# Patient Record
Sex: MALE | Race: WHITE
[De-identification: names, ages, dates, MRNs, and addresses within clinical notes are randomized per-mention and may not be internally consistent; named-entity substitution may affect disease eponyms.]

---

## 2019-03-05 ENCOUNTER — HOSPITAL ENCOUNTER (EMERGENCY)
Dept: HOSPITAL 97 - ER | Age: 69
LOS: 1 days | Discharge: ANOTHER HOSPITAL | End: 2019-03-06
Payer: COMMERCIAL

## 2019-03-05 DIAGNOSIS — Z88.5: ICD-10-CM

## 2019-03-05 DIAGNOSIS — Z79.82: ICD-10-CM

## 2019-03-05 DIAGNOSIS — Z95.810: ICD-10-CM

## 2019-03-05 DIAGNOSIS — I50.40: ICD-10-CM

## 2019-03-05 DIAGNOSIS — D63.1: ICD-10-CM

## 2019-03-05 DIAGNOSIS — I12.9: Primary | ICD-10-CM

## 2019-03-05 DIAGNOSIS — E87.6: ICD-10-CM

## 2019-03-05 DIAGNOSIS — N17.9: ICD-10-CM

## 2019-03-05 DIAGNOSIS — Z95.1: ICD-10-CM

## 2019-03-05 DIAGNOSIS — Z79.01: ICD-10-CM

## 2019-03-05 DIAGNOSIS — Z88.8: ICD-10-CM

## 2019-03-05 DIAGNOSIS — E11.22: ICD-10-CM

## 2019-03-05 DIAGNOSIS — Z79.4: ICD-10-CM

## 2019-03-05 DIAGNOSIS — N18.9: ICD-10-CM

## 2019-03-05 LAB
ALBUMIN SERPL BCP-MCNC: 3.6 G/DL (ref 3.4–5)
ALBUMIN SERPL BCP-MCNC: 3.7 G/DL (ref 3.4–5)
ALP SERPL-CCNC: 50 U/L (ref 45–117)
ALP SERPL-CCNC: 54 U/L (ref 45–117)
ALT SERPL W P-5'-P-CCNC: 14 U/L (ref 12–78)
ALT SERPL W P-5'-P-CCNC: 16 U/L (ref 12–78)
AST SERPL W P-5'-P-CCNC: 14 U/L (ref 15–37)
AST SERPL W P-5'-P-CCNC: 15 U/L (ref 15–37)
BUN BLD-MCNC: 25 MG/DL (ref 7–18)
BUN BLD-MCNC: 25 MG/DL (ref 7–18)
GLUCOSE SERPLBLD-MCNC: 260 MG/DL (ref 74–106)
GLUCOSE SERPLBLD-MCNC: 269 MG/DL (ref 74–106)
HCT VFR BLD CALC: 23.6 % (ref 39.6–49)
INR BLD: 1.69
LYMPHOCYTES # SPEC AUTO: 1.1 K/UL (ref 0.7–4.9)
MAGNESIUM SERPL-MCNC: 2.3 MG/DL (ref 1.8–2.4)
NT-PROBNP SERPL-MCNC: 2086 PG/ML (ref ?–125)
PMV BLD: 7.3 FL (ref 7.6–11.3)
POTASSIUM SERPL-SCNC: 3.4 MMOL/L (ref 3.5–5.1)
POTASSIUM SERPL-SCNC: 3.5 MMOL/L (ref 3.5–5.1)
RBC # BLD: 2.83 M/UL (ref 4.33–5.43)
TROPONIN (EMERG DEPT USE ONLY): < 0.02 NG/ML (ref 0–0.04)

## 2019-03-05 PROCEDURE — 83735 ASSAY OF MAGNESIUM: CPT

## 2019-03-05 PROCEDURE — 99284 EMERGENCY DEPT VISIT MOD MDM: CPT

## 2019-03-05 PROCEDURE — 80076 HEPATIC FUNCTION PANEL: CPT

## 2019-03-05 PROCEDURE — 83880 ASSAY OF NATRIURETIC PEPTIDE: CPT

## 2019-03-05 PROCEDURE — 80053 COMPREHEN METABOLIC PANEL: CPT

## 2019-03-05 PROCEDURE — 30901 CONTROL OF NOSEBLEED: CPT

## 2019-03-05 PROCEDURE — 36430 TRANSFUSION BLD/BLD COMPNT: CPT

## 2019-03-05 PROCEDURE — 96374 THER/PROPH/DIAG INJ IV PUSH: CPT

## 2019-03-05 PROCEDURE — 84484 ASSAY OF TROPONIN QUANT: CPT

## 2019-03-05 PROCEDURE — 71045 X-RAY EXAM CHEST 1 VIEW: CPT

## 2019-03-05 PROCEDURE — 86850 RBC ANTIBODY SCREEN: CPT

## 2019-03-05 PROCEDURE — 86901 BLOOD TYPING SEROLOGIC RH(D): CPT

## 2019-03-05 PROCEDURE — 85610 PROTHROMBIN TIME: CPT

## 2019-03-05 PROCEDURE — 80048 BASIC METABOLIC PNL TOTAL CA: CPT

## 2019-03-05 PROCEDURE — 36415 COLL VENOUS BLD VENIPUNCTURE: CPT

## 2019-03-05 PROCEDURE — 86900 BLOOD TYPING SEROLOGIC ABO: CPT

## 2019-03-05 PROCEDURE — 85025 COMPLETE CBC W/AUTO DIFF WBC: CPT

## 2019-03-05 PROCEDURE — 93005 ELECTROCARDIOGRAM TRACING: CPT

## 2019-03-05 NOTE — EDPHYS
Physician Documentation                                                                           

 [facility]                                                                

Name: Tu Marie                                                                                 

Age: 68 yrs                                                                                       

Sex: Male                                                                                         

: 1950                                                                                   

MRN: [id number]                                                                                   

Arrival Date: 2019                                                                          

Time: 20:41                                                                                       

Account#: [id number]                                                                            

Bed 2                                                                                             

Private MD:                                                                                       

ED Yaniv Ontiveros                                                                      

HPI:                                                                                              

                                                                                             

21:16 This 68 yrs old  Male presents to ER via Ambulatory with complaints of Nose    stefanie 

      Bleed.                                                                                      

21:16 The patient presents with a nose bleed. Onset: The symptoms/episode began/occurred just stefanie 

      prior to arrival. Modifying factors: The symptoms are alleviated by nothing. the            

      symptoms are aggravated by nothing. Associated signs and symptoms: The patient has no       

      apparent associated signs or symptoms. Severity of symptoms: At their worst the             

      symptoms were. The patient has not experienced similar symptoms in the past.                

                                                                                                  

Historical:                                                                                       

- Allergies:                                                                                      

20:59 Codeine;                                                                                tl2 

20:59 Fentanyl;                                                                               tl2 

- Home Meds:                                                                                      

20:59 aspirin 81 mg Oral chew 1 tab once daily [Active]; carvedilol 25 mg Oral tab 1 tab      tl2 

      daily [Active]; Crestor 5 mg Oral tab 1 tab once daily [Active]; fenofibrate 150 mg         

      Oral cap 1 cap once daily [Active]; ferrous sulfate 325 mg (65 mg iron) Oral tab daily      

      [Active]; furosemide 40 mg Oral tab 1 tab once daily [Active]; Lantus 100 unit/mL Sub-Q     

      soln 26 unit daily [Active]; liraglutide subcutaneous subcutaneous 0.3 mL once daily        

      [Active]; lisinopril 5 mg Oral tab 1 tab twice a day [Active]; Multiple Vitamins Oral       

      tab daily [Active]; potassium chloride 20 mEq Oral TbTQ 1 tab once daily [Active];          

      rosuvastatin 5 mg Oral tab 1 tab once daily [Active]; tamsulosin 0.4 mg Oral cp24 1 cap     

      once daily [Active]; Warfarin 1.5 mg Oral once daily [Active]; victoza 1.8 mg daily         

      [Active];                                                                                   

- PMHx:                                                                                           

20:59 CAD; CHF; Diabetes - IDDM; Hyperlipidemia; Hypertension;                                tl2 

- PSHx:                                                                                           

20:59 LVAD; CABG; pacemaker;                                                                  tl2 

                                                                                                  

- Immunization history:: Adult Immunizations up to date.                                          

- Social history:: Smoking status: Patient/guardian denies using tobacco.                         

- Ebola Screening: : No symptoms or risks identified at this time.                                

- Family history:: not pertinent.                                                                 

                                                                                                  

                                                                                                  

ROS:                                                                                              

21:16 Constitutional: Negative for fever, chills, and weight loss, Eyes: Negative for injury, stefanie 

      pain, redness, and discharge, Neck: Negative for injury, pain, and swelling,                

      Cardiovascular: Negative for chest pain, palpitations, and edema, Respiratory: Negative     

      for shortness of breath, cough, wheezing, and pleuritic chest pain, Abdomen/GI:             

      Negative for abdominal pain, nausea, vomiting, diarrhea, and constipation, Back:            

      Negative for injury and pain, : Negative for injury, bleeding, discharge, and             

      swelling, MS/Extremity: Negative for injury and deformity, Skin: Negative for injury,       

      rash, and discoloration, Neuro: Negative for headache, weakness, numbness, tingling,        

      and seizure, Psych: Negative for depression, anxiety, suicide ideation, homicidal           

      ideation, and hallucinations, Allergy/Immunology: Negative for hives, rash, and             

      allergies, Endocrine: Negative for neck swelling, polydipsia, polyuria, polyphagia, and     

      marked weight changes.                                                                      

21:16 ENT: Positive for nose bleed.                                                               

                                                                                                  

Exam:                                                                                             

21:16 Constitutional:  This is a well developed, well nourished patient who is awake, alert,  stefanie 

      and in no acute distress. Head/Face:  Normocephalic, atraumatic. Eyes:  Pupils equal        

      round and reactive to light, extra-ocular motions intact.  Lids and lashes normal.          

      Conjunctiva and sclera are non-icteric and not injected.  Cornea within normal limits.      

      Periorbital areas with no swelling, redness, or edema. Neck:  Trachea midline, no           

      thyromegaly or masses palpated, and no cervical lymphadenopathy.  Supple, full range of     

      motion without nuchal rigidity, or vertebral point tenderness.  No Meningismus.             

      Chest/axilla:  Normal chest wall appearance and motion.  Nontender with no deformity.       

      No lesions are appreciated. Cardiovascular:  Regular rate and rhythm with a normal S1       

      and S2.  No gallops, murmurs, or rubs.  Normal PMI, no JVD.  No pulse deficits.             

      Respiratory:  Lungs have equal breath sounds bilaterally, clear to auscultation and         

      percussion.  No rales, rhonchi or wheezes noted.  No increased work of breathing, no        

      retractions or nasal flaring. Abdomen/GI:  Soft, non-tender, with normal bowel sounds.      

      No distension or tympany.  No guarding or rebound.  No evidence of tenderness               

      throughout. Back:  No spinal tenderness.  No costovertebral tenderness.  Full range of      

      motion. Skin:  Warm, dry with normal turgor.  Normal color with no rashes, no lesions,      

      and no evidence of cellulitis. MS/ Extremity:  Pulses equal, no cyanosis.                   

      Neurovascular intact.  Full, normal range of motion. Neuro:  Awake and alert, GCS 15,       

      oriented to person, place, time, and situation.  Cranial nerves II-XII grossly intact.      

      Motor strength 5/5 in all extremities.  Sensory grossly intact.  Cerebellar exam            

      normal.  Normal gait. Psych:  Awake, alert, with orientation to person, place and time.     

       Behavior, mood, and affect are within normal limits.                                       

21:16 ENT: Nose: Nasal mucosa: Dried blood. edematous.                                            

                                                                                                  

Vital Signs:                                                                                      

20:50  / 86; Pulse 73; Resp 18; Temp 97.9; Pulse Ox 100% on R/A; Weight 87.54 kg;       tl2 

      Height 5 ft. 7 in. (170.18 cm); Pain 0/10;                                                  

22:24  / 96; Pulse 69; Resp 18; Pulse Ox 96% on R/A;                                    tl2 

23:26  / 101; Pulse 75; Resp 18; Pulse Ox 96% on R/A;                                   tl2 

03/06                                                                                             

00:24  / 83; Pulse 73; Resp 24; Pulse Ox 99% on R/A;                                    tl2 

01:30  / 86; Pulse 73; Resp 19; Temp 98.5; Pulse Ox 98% on R/A;                         2 

03                                                                                             

20:50 Body Mass Index 30.23 (87.54 kg, 170.18 cm)                                             tl2 

                                                                                                  

Procedures:                                                                                       

                                                                                             

21:16 Epistaxis treatment: A moderate amount of bleeding noted from left nare.                [facility] 

22:33 Epistaxis treatment: Treated using anterior packing, Vaseline gauze, Bleeding stopped.  [facility] 

                                                                                                  

MDM:                                                                                              

21:05 Patient medically screened.                                                             [facility] 

21:16 Data reviewed: vital signs, nurses notes, lab test result(s).                           [facility] 

                                                                                                  

                                                                                             

21:16 Order name: CBC with Diff                                                               [facility] 

                                                                                             

21:16 Order name: Comprehensive Metabolic Panel; Complete Time: 22:10                         [facility] 

                                                                                             

21:16 Order name: PT-INR; Complete Time: 22:36                                                [facility] 

                                                                                             

22:11 Order name: Basic Metabolic Panel                                                       [facility] 

                                                                                             

22:11 Order name: LFT's                                                                       [facility] 

                                                                                             

22:11 Order name: Magnesium                                                                   [facility] 

                                                                                             

22:11 Order name: NT PRO-BNP                                                                  [facility] 

                                                                                             

22:11 Order name: Troponin (emerg Dept Use Only)                                              [facility] 

                                                                                             

22:11 Order name: XRAY Chest (1 view)                                                         [facility] 

                                                                                             

22:11 Order name: Type And Screen                                                             [facility] 

                                                                                             

23:08 Order name: ABO/RH no charge                                                            [facility]

                                                                                             

00:47 Order name: Packed RBC Leukored AS-1                                                    [facility]

                                                                                             

21:32 Order name: Dressing - Wound; Complete Time: 22:42                                      [facility] 

                                                                                             

21:32 Order name: Gloves, Sterile; Complete Time: 21:36                                       [facility] 

                                                                                             

21:32 Order name: Setup Suture Tray; Complete Time: 21:36                                     [facility] 

                                                                                             

22:11 Order name: EKG; Complete Time: 22:13                                                   [facility] 

                                                                                             

22:11 Order name: Cardiac monitoring; Complete Time: 22:42                                    [facility] 

                                                                                             

22:11 Order name: EKG - Nurse/Tech; Complete Time: 22:41                                      [facility] 

                                                                                             

22:11 Order name: IV Saline Lock; Complete Time: 22:12                                        [facility] 

                                                                                             

22:11 Order name: Labs collected and sent; Complete Time: 22:13                               [facility] 

                                                                                             

22:11 Order name: O2 Per Protocol; Complete Time: 22:13                                       [facility] 

                                                                                             

22:11 Order name: O2 Sat Monitoring; Complete Time: 22:13                                     [facility] 

                                                                                             

22:35 Order name: Transfuse; Complete Time: 01:56                                             [facility] 

                                                                                                  

Administered Medications:                                                                         

22:42 Not Given (Physician Discretion): NS 0.9% 1000 ml IV at 75 ml/hr continuous             tl2 

23:01 Drug: ProTONIX 40 mg Route: IVP; Site: left forearm;                                    tl2 

                                                                                             

01:58 Follow up: Response: No adverse reaction                                                tl2 

                                                                                                  

                                                                                                  

Disposition:                                                                                      

19 22:38 Transfer ordered to [facility]. Diagnosis are Epistaxis,       

  Anemia, unspecified, Unspecified kidney failure, Unspecified combined                           

  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                     

  Hypokalemia.                                                                                    

- Reason for transfer: Higher level of care.                                                      

- Accepting physician is to Religious, chf team.                                                  

- Condition is Fair.                                                                              

- Problem is new.                                                                                 

- Symptoms have improved.                                                                         

                                                                                                  

                                                                                                  

                                                                                                  

Signatures:                                                                                       

Dispatcher [employer]                           EDYaniv Tejeda MD MD cha Knox, Taylor, RN                        RN   tl2                                                  

                                                                                                  

Corrections: (The following items were deleted from the chart)                                    

                                                                                             

22:38 22:38 2019 22:38 Transfer ordered to [facility]. Diagnosis is stefanie 

      Epistaxis; Anemia, unspecified; Unspecified kidney failure; Unspecified combined            

      systolic (congestive) and diastolic (congestive) heart failure - with LVAT. Reason for      

      transfer: Higher level of care. Accepting physician is to Religious, [facility] team.              

      Condition is Fair. Problem is new. Symptoms have improved. stefanie                              

                                                                                             

01:58  22:38 2019 22:38 Transfer ordered to [facility].        tl2 

      Diagnosis is Epistaxis; Anemia, unspecified; Unspecified kidney failure; Unspecified        

      combined systolic (congestive) and diastolic (congestive) heart failure - with LVAT;        

      Hypokalemia. Reason for transfer: Higher level of care. Accepting physician is to           

      Religious, chf team. Condition is Fair. Problem is new. Symptoms have improved. stefanie         

                                                                                                  

**************************************************************************************************

## 2019-03-05 NOTE — ER
Nurse's Notes                                                                                     

 [facility]                                                                

Name: Tu Marie                                                                                 

Age: 68 yrs                                                                                       

Sex: Male                                                                                         

: 1950                                                                                   

MRN: [id number]                                                                                   

Arrival Date: 2019                                                                          

Time: 20:41                                                                                       

Account#: [id number]                                                                            

Bed 2                                                                                             

Private MD:                                                                                       

Diagnosis: Epistaxis;Anemia, unspecified;Unspecified kidney failure;Unspecified combined systolic 

  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                     

                                                                                                  

Presentation:                                                                                     

                                                                                             

20:53 Presenting complaint: Patient states: Nose bleed started at 1720 this evening from the  tl2 

      L nostril but now the bleed is coming from both nostrils. Pt has had nose bleeds before     

      and they have used silver nitrate sticks. Pt is on Warfarin 1.5 mg/day. Transition of       

      care: patient was not received from another setting of care. Onset of symptoms was          

      2019 at 17:20. Risk Assessment: Do you want to hurt yourself or someone else?     

      Patient reports no desire to harm self or others. Initial Sepsis Screen: Does the           

      patient meet any 2 criteria? No. Patient's initial sepsis screen is negative. Does the      

      patient have a suspected source of infection? No. Patient's initial sepsis screen is        

      negative. Care prior to arrival: None.                                                      

20:53 Method Of Arrival: Ambulatory                                                           tl2 

20:53 Acuity: GERMAN 3                                                                           tl2 

                                                                                                  

Triage Assessment:                                                                                

21:01 General: Appears in no apparent distress. comfortable, Behavior is calm, cooperative,   tl2 

      appropriate for age. Pain: Denies pain.                                                     

                                                                                                  

Historical:                                                                                       

- Allergies:                                                                                      

20:59 Codeine;                                                                                tl2 

20:59 Fentanyl;                                                                               tl2 

- Home Meds:                                                                                      

20:59 aspirin 81 mg Oral chew 1 tab once daily [Active]; carvedilol 25 mg Oral tab 1 tab      tl2 

      daily [Active]; Crestor 5 mg Oral tab 1 tab once daily [Active]; fenofibrate 150 mg         

      Oral cap 1 cap once daily [Active]; ferrous sulfate 325 mg (65 mg iron) Oral tab daily      

      [Active]; furosemide 40 mg Oral tab 1 tab once daily [Active]; Lantus 100 unit/mL Sub-Q     

      soln 26 unit daily [Active]; liraglutide subcutaneous subcutaneous 0.3 mL once daily        

      [Active]; lisinopril 5 mg Oral tab 1 tab twice a day [Active]; Multiple Vitamins Oral       

      tab daily [Active]; potassium chloride 20 mEq Oral TbTQ 1 tab once daily [Active];          

      rosuvastatin 5 mg Oral tab 1 tab once daily [Active]; tamsulosin 0.4 mg Oral cp24 1 cap     

      once daily [Active]; Warfarin 1.5 mg Oral once daily [Active]; victoza 1.8 mg daily         

      [Active];                                                                                   

- PMHx:                                                                                           

20:59 CAD; CHF; Diabetes - IDDM; Hyperlipidemia; Hypertension;                                tl2 

- PSHx:                                                                                           

20:59 LVAD; CABG; pacemaker;                                                                  tl2 

                                                                                                  

- Immunization history:: Adult Immunizations up to date.                                          

- Social history:: Smoking status: Patient/guardian denies using tobacco.                         

- Ebola Screening: : No symptoms or risks identified at this time.                                

- Family history:: not pertinent.                                                                 

                                                                                                  

                                                                                                  

Screenin:01 Abuse screen: Denies threats or abuse. Nutritional screening: No deficits noted.        tl2 

      Tuberculosis screening: No symptoms or risk factors identified. Fall Risk Secondary         

      diagnosis (15 points) Gait- Weak (10 pts.).                                                 

                                                                                                  

Assessment:                                                                                       

21:10 General: Appears in no apparent distress. comfortable, Behavior is calm, cooperative,   tl2 

      appropriate for age. Pain: Denies pain. Neuro: Level of Consciousness is awake, alert,      

      obeys commands, Oriented to person, place, time, situation. Cardiovascular: Denies          

      chest pain, LVAD in placed. Respiratory: Airway is patent Respiratory effort is even,       

      unlabored, Respiratory pattern is regular, symmetrical. GI: No signs and/or symptoms        

      were reported involving the gastrointestinal system. : No signs and/or symptoms were      

      reported regarding the genitourinary system. EENT: Nares with bleeding noted                

      bilaterally. Derm: Skin is pale.                                                            

22:27 Reassessment: Patient appears in no apparent distress at this time. Patient and/or      tl2 

      family updated on plan of care and expected duration. Pain level reassessed. Patient is     

      alert, oriented x 3, equal unlabored respirations, skin warm/dry/pink.                      

23:30 Reassessment: Patient appears in no apparent distress at this time. Patient and/or      tl2 

      family updated on plan of care and expected duration. Pain level reassessed. Patient is     

      alert, oriented x 3, equal unlabored respirations, skin warm/dry/pink.                      

                                                                                             

00:30 Reassessment: Patient appears in no apparent distress at this time. Patient and/or      tl2 

      family updated on plan of care and expected duration. Pain level reassessed. Patient is     

      alert, oriented x 3, equal unlabored respirations, skin warm/dry/pink.                      

01:14 Reassessment: first unit of blood started at 0114, see transfusion record for vitals.   tl2 

      Reassessment: awaiting transport.                                                           

                                                                                                  

Vital Signs:                                                                                      

                                                                                             

20:50  / 86; Pulse 73; Resp 18; Temp 97.9; Pulse Ox 100% on R/A; Weight 87.54 kg;       tl2 

      Height 5 ft. 7 in. (170.18 cm); Pain 0/10;                                                  

22:24  / 96; Pulse 69; Resp 18; Pulse Ox 96% on R/A;                                    tl2 

23:26  / 101; Pulse 75; Resp 18; Pulse Ox 96% on R/A;                                   tl2 

                                                                                             

00:24  / 83; Pulse 73; Resp 24; Pulse Ox 99% on R/A;                                    tl2 

01:30  / 86; Pulse 73; Resp 19; Temp 98.5; Pulse Ox 98% on R/A;                         tl2 

                                                                                             

20:50 Body Mass Index 30.23 (87.54 kg, 170.18 cm)                                             tl2 

                                                                                                  

ED Course:                                                                                        

                                                                                             

20:41 Patient arrived in ED.                                                                  es  

20:50 Arm band placed on right wrist.                                                         tl2 

20:53 Ria Youssef, RN is Primary Nurse.                                                      tl2 

20:55 Triage completed.                                                                       tl2 

21:01 Patient has correct armband on for positive identification. Bed in low position. Call   tl2 

      light in reach. Side rails up X2. Adult w/ patient.                                         

21:05 Yaniv Guillermo MD is Attending Physician.                                             stefanie 

22:15 Inserted saline lock: 20 gauge in left forearm, using aseptic technique. Blood          tl2 

      collected.                                                                                  

22:24 XRAY Chest (1 view) In Process Unspecified.                                             EDMS

22:39 Notified ED physician of a critical lab result(s). hemoglobin 7.8.                      fc  

                                                                                                  

Administered Medications:                                                                         

22:42 Not Given (Physician Discretion): NS 0.9% 1000 ml IV at 75 ml/hr continuous             tl2 

23:01 Drug: ProTONIX 40 mg Route: IVP; Site: left forearm;                                    tl2 

                                                                                             

01:58 Follow up: Response: No adverse reaction                                                tl2 

                                                                                                  

                                                                                                  

Medication:                                                                                       

01:14 Blood products: PRBCs X 1 unit given. See transfusion record.                           tl2 

                                                                                                  

Outcome:                                                                                          

                                                                                             

22:38 ER care complete, transfer ordered by MD.                                               stefanie 

                                                                                             

01:58 Patient left the ED.                                                                    tl2 

                                                                                                  

Signatures:                                                                                       

Dispatcher [employer]                           Yaniv Alves MD MD cha Salyer, Debi Chan RN                   RN   Ria Zarco RN                        RN   tl2                                                  

                                                                                                  

Corrections: (The following items were deleted from the chart)                                    

                                                                                             

21:00 20:50  / 86; Pulse 73bpm; Resp 18bpm; Pulse Ox 100% RA; Temp 97.9F; tl2           tl2 

22:27 21:10 General: see triage assessment. tl2                                               tl2 

                                                                                                  

**************************************************************************************************

## 2019-03-06 VITALS — DIASTOLIC BLOOD PRESSURE: 86 MMHG | TEMPERATURE: 98.5 F | SYSTOLIC BLOOD PRESSURE: 106 MMHG | OXYGEN SATURATION: 98 %

## 2019-03-06 PROCEDURE — 2Y41X5Z PACKING OF NASAL REGION USING PACKING MATERIAL: ICD-10-PCS

## 2019-03-06 PROCEDURE — 30233N1 TRANSFUSION OF NONAUTOLOGOUS RED BLOOD CELLS INTO PERIPHERAL VEIN, PERCUTANEOUS APPROACH: ICD-10-PCS

## 2019-03-06 NOTE — EKG
Test Date:    2019-03-05               Test Time:    22:33:04

Technician:   SLICK                                     

                                                     

MEASUREMENT RESULTS:                                       

Intervals:                                           

Rate:         72                                     

DC:                                                  

QRSD:         162                                    

QT:           478                                    

QTc:          523                                    

Axis:                                                

P:                                                   

DC:                                                  

QRS:          -52                                    

T:            132                                    

                                                     

INTERPRETIVE STATEMENTS:                                       

                                                     

Atrial-sensed ventricular-paced rhythm

tracking sinus rhythm with premature atrial complexes

Abnormal ECG

Compared to ECG 09/12/2014 15:27:28

Atrial-sensed ventricular-paced rhythm

is now present

Electronically Signed On 03-06-19 12:36:24 CST by Rinku Miranda

## 2021-10-26 ENCOUNTER — HOSPITAL ENCOUNTER (EMERGENCY)
Dept: HOSPITAL 97 - ER | Age: 71
Discharge: ANOTHER HOSPITAL | End: 2021-10-26
Payer: COMMERCIAL

## 2021-10-26 VITALS — SYSTOLIC BLOOD PRESSURE: 122 MMHG | DIASTOLIC BLOOD PRESSURE: 82 MMHG | OXYGEN SATURATION: 99 %

## 2021-10-26 VITALS — TEMPERATURE: 98 F

## 2021-10-26 DIAGNOSIS — E11.9: ICD-10-CM

## 2021-10-26 DIAGNOSIS — G45.9: Primary | ICD-10-CM

## 2021-10-26 DIAGNOSIS — Z79.4: ICD-10-CM

## 2021-10-26 DIAGNOSIS — R29.701: ICD-10-CM

## 2021-10-26 DIAGNOSIS — Z79.01: ICD-10-CM

## 2021-10-26 DIAGNOSIS — E78.5: ICD-10-CM

## 2021-10-26 DIAGNOSIS — I10: ICD-10-CM

## 2021-10-26 DIAGNOSIS — Z20.822: ICD-10-CM

## 2021-10-26 DIAGNOSIS — Z88.5: ICD-10-CM

## 2021-10-26 LAB
ALBUMIN SERPL BCP-MCNC: 3.5 G/DL (ref 3.4–5)
ALP SERPL-CCNC: 66 U/L (ref 45–117)
ALT SERPL W P-5'-P-CCNC: 22 U/L (ref 12–78)
AST SERPL W P-5'-P-CCNC: 18 U/L (ref 15–37)
BUN BLD-MCNC: 15 MG/DL (ref 7–18)
GLUCOSE SERPLBLD-MCNC: 377 MG/DL (ref 74–106)
HCT VFR BLD CALC: 26.3 % (ref 39.6–49)
INR BLD: 1.64
LYMPHOCYTES # SPEC AUTO: 0.8 K/UL (ref 0.7–4.9)
MAGNESIUM SERPL-MCNC: 1.9 MG/DL (ref 1.8–2.4)
PMV BLD: 7.4 FL (ref 7.6–11.3)
POTASSIUM SERPL-SCNC: 4.1 MMOL/L (ref 3.5–5.1)
RBC # BLD: 3.23 M/UL (ref 4.33–5.43)
TROPONIN (EMERG DEPT USE ONLY): 0.02 NG/ML (ref 0–0.04)

## 2021-10-26 PROCEDURE — 36415 COLL VENOUS BLD VENIPUNCTURE: CPT

## 2021-10-26 PROCEDURE — 83880 ASSAY OF NATRIURETIC PEPTIDE: CPT

## 2021-10-26 PROCEDURE — 83735 ASSAY OF MAGNESIUM: CPT

## 2021-10-26 PROCEDURE — 93005 ELECTROCARDIOGRAM TRACING: CPT

## 2021-10-26 PROCEDURE — 99285 EMERGENCY DEPT VISIT HI MDM: CPT

## 2021-10-26 PROCEDURE — 85610 PROTHROMBIN TIME: CPT

## 2021-10-26 PROCEDURE — 80076 HEPATIC FUNCTION PANEL: CPT

## 2021-10-26 PROCEDURE — 85025 COMPLETE CBC W/AUTO DIFF WBC: CPT

## 2021-10-26 PROCEDURE — 84484 ASSAY OF TROPONIN QUANT: CPT

## 2021-10-26 PROCEDURE — 80048 BASIC METABOLIC PNL TOTAL CA: CPT

## 2021-10-26 PROCEDURE — 71045 X-RAY EXAM CHEST 1 VIEW: CPT

## 2021-10-26 PROCEDURE — 70450 CT HEAD/BRAIN W/O DYE: CPT

## 2021-10-26 NOTE — RAD REPORT
EXAM DESCRIPTION:  [facility] Single View10/26/2021 8:30 am

 

CLINICAL HISTORY:  Chest pain

 

COMPARISON:  2015

 

FINDINGS:   The lungs appear clear of acute infiltrate. The heart is mildly enlarged. Pacemaker leads
 are in place.

 

Postsurgical changes involve the chest. Chronic elevation right hemidiaphragm

 

IMPRESSION:   No acute abnormalities displayed

## 2021-10-26 NOTE — EDPHYS
Physician Documentation                                                                           

 [facility]                                                                 

Name: Tu Marie                                                                                 

Age: 71 yrs                                                                                       

Sex: Male                                                                                         

: 1950                                                                                   

MRN: [id number]                                                                                   

Arrival Date: 10/26/2021                                                                          

Time: 07:47                                                                                       

Account#: [id number]                                                                            

Bed 4                                                                                             

Private MD: KEREN Horowitz C                                                                            

ED Physician Jigar Flower                                                                         

HPI:                                                                                              

10/26                                                                                             

08:07 This 71 yrs old  Male presents to ER via EMS with complaints of Aphasia .      jr8 

08:07 The patient's problem is reported as dysphasia, incoherent speech, expressive aphasia.  jr8 

      Onset: The symptoms/episode began/occurred acutely, just prior to arrival, today.           

      Duration: This was a single incident, lasting 15 minute(s). Context: the episode(s) was     

      witnessed, by family, occurred at home, occurred while the patient was at rest. The         

      symptoms are alleviated by nothing. The symptoms are aggravated by nothing. Associated      

      signs and symptoms: The patient has no apparent associated signs or symptoms. Severity      

      of symptoms: At their worst the symptoms were moderate in the emergency department the      

      symptoms have resolved and did so just prior to arrival. Patient's baseline: Neuro:         

      alert and fully oriented, Motor: no deficits, Ambulation: walks without assistance,         

      Speech: normal. The patient has not experienced similar symptoms in the past. The           

      patient has not recently seen a physician. Family stated that patient had just finished     

      breakfast and went to his room. Wife heard him yell out for her. When she got there he      

      had "word salad" like speech and expressive aphasia. Symptoms lasted approximately 15       

      minutes. EMS was called at that time. Symptoms had resolved prior to EMS arrival.           

      Patient currently alert and oriented x4 with no complaints at this time. Patient with       

      history of LVAD..                                                                           

                                                                                                  

Historical:                                                                                       

- Allergies:                                                                                      

07:48 Codeine;                                                                                jl7 

07:48 Fentanyl;                                                                               jl7 

07:48 Xanax;                                                                                  jl7 

- Home Meds:                                                                                      

07:48 digoxin 125 mcg (0.125 mg) Oral tab 1 tab once daily [Active]; fenofibrate 150 mg Oral  jl7 

      cap 1 cap once daily [Active]; ferrous sulfate 325 mg (65 mg iron) Oral tab daily           

      [Active]; Lantus 100 unit/mL Sub-Q soln 26 unit daily [Active]; liraglutide                 

      subcutaneous 0.3 mL once daily [Active]; losartan 25 mg oral tab 2 tabs once daily          

      [Active]; Multiple Vitamins Oral tab daily [Active]; Protonix 40 mg Oral TbEC 1 tab         

      once daily [Active]; rosuvastatin 5 mg Oral tab 1 tab once daily [Active]; Senokot S        

      Oral [Active]; spironolactone 25 mg Oral tab 0.5 tab once daily [Active]; tamsulosin        

      0.4 mg Oral cp24 1 cap once daily [Active]; warfarin oral once daily [Active];              

- PMHx:                                                                                           

07:48 CAD; CHF; Diabetes - IDDM; Hyperlipidemia; Hypertension;                                jl7 

- PSHx:                                                                                           

07:48 LVAD;                                                                                   jl7 

                                                                                                  

- Immunization history:: Adult Immunizations up to date, Client reports receiving the             

  2nd dose of the Covid vaccine.                                                                  

- Social history:: Smoking status: Patient denies any tobacco usage or history of.                

                                                                                                  

                                                                                                  

ROS:                                                                                              

08:07 Eyes: Negative for injury, pain, redness, and discharge, ENT: Negative for injury,      jr8 

      pain, and discharge, Neck: Negative for injury, pain, and swelling, Cardiovascular:         

      Negative for chest pain, palpitations, and edema, Respiratory: Negative for shortness       

      of breath, cough, wheezing, and pleuritic chest pain, Abdomen/GI: Negative for              

      abdominal pain, nausea, vomiting, diarrhea, and constipation, Back: Negative for injury     

      and pain, MS/Extremity: Negative for injury and deformity, Skin: Negative for injury,       

      rash, and discoloration.                                                                    

08:07 Neuro: Positive for speech changes.                                                         

                                                                                                  

Exam:                                                                                             

08:07 Constitutional:  This is a well developed, well nourished patient who is awake, alert,  jr8 

      and in no acute distress. Eyes:  Pupils equal round and reactive to light, extra-ocular     

      motions intact.  Lids and lashes normal.  Conjunctiva and sclera are non-icteric and        

      not injected.  Cornea within normal limits.  Periorbital areas with no swelling,            

      redness, or edema. ENT:  Nares patent. No nasal discharge, no septal abnormalities          

      noted.  Tympanic membranes are normal and external auditory canals are clear.               

      Oropharynx with no redness, swelling, or masses, exudates, or evidence of obstruction,      

      uvula midline.  Mucous membranes moist. Neck:  Trachea midline, no thyromegaly or           

      masses palpated, and no cervical lymphadenopathy.  Supple, full range of motion without     

      nuchal rigidity, or vertebral point tenderness.  No Meningismus. Cardiovascular:            

      Patient is a rate of 68 irregularly irregular rhythm.  Harsh consistent vibrating           

      holosystolic murmur consistent with his LVAD present.  No pulse deficits. Respiratory:      

      Lungs have equal breath sounds bilaterally, clear to auscultation and percussion.  No       

      rales, rhonchi or wheezes noted.  No increased work of breathing, no retractions or         

      nasal flaring. Abdomen/GI:  Soft, non-tender, with normal bowel sounds.  No distension      

      or tympany.  No guarding or rebound.  No evidence of tenderness throughout. Skin:           

      Warm, dry with normal turgor.  Normal color with no rashes, no lesions, and no evidence     

      of cellulitis. MS/ Extremity:  Pulses equal, no cyanosis.  Neurovascular intact.  Full,     

      normal range of motion. Neuro:  Awake and alert, GCS 15, oriented to person, place,         

      time, and situation.  Cranial nerves II-XII grossly intact.  Motor strength 5/5 in all      

      extremities.  Sensory grossly intact.  Cerebellar exam normal.  Normal gait.                

08:46 ECG was reviewed by the Attending Physician.                                            jr8 

12:21 Radiologist reports: No acute findings                                                  jr8 

                                                                                                  

Vital Signs:                                                                                      

07:45  / 79; Pulse 68; Resp 17; Temp 98; Pulse Ox 100% ; Weight 71.67 kg; Height 5 ft.  bp  

      7 in. (170.18 cm);                                                                          

08:55  / 79; Pulse 60; Resp 16; Pulse Ox 98% ;                                          bp  

09:40  / 82; Pulse 65; Resp 19; Pulse Ox 100% on R/A;                                   ap3 

11:02  / 81; Pulse 66; Resp 21; Pulse Ox 96% ;                                          bp  

12:05  / 78; Pulse 63; Resp 17; Pulse Ox 100% ;                                         bp  

13:10  / 82; Pulse 63; Resp 18; Pulse Ox 99% ;                                          bp  

07:45 Body Mass Index 24.75 (71.67 kg, 170.18 cm)                                             bp  

                                                                                                  

NIH Stroke Scale Scores:                                                                          

08:07 NIHSS Score: 1                                                                          jr8 

                                                                                                  

MDM:                                                                                              

07:59 Patient medically screened.                                                             jr8 

11:57 Data reviewed: vital signs, nurses notes, lab test result(s), EKG, radiologic studies,  jr8 

      CT scan, plain films. Data interpreted: Pulse oximetry: on room air is 96 %.                

      Interpretation: normal. Counseling: I had a detailed discussion with the patient and/or     

      guardian regarding: the historical points, exam findings, and any diagnostic results        

      supporting the discharge/admit diagnosis, lab results, radiology results, the need to       

      transfer to another facility, [facility] does not immediately have        

      the required specialist. ED course: Discussed with patient and family that based on his     

      symptoms it appears that he has had either a transient ischemic attack versus small         

      CVA. Recommended transfer to [facility] as his LVAD coordination is there and that the       

      strokes could be related to the LVAD. Family and patient good with this. [facility] has      

      been called and is working on room assignment..                                             

                                                                                                  

10/26                                                                                             

12:55 Order name: Basic Metabolic Panel                                                       EDMS

10/26                                                                                             

12:55 Order name: Liver (Hepatic) Function                                                    EDMS

10/26                                                                                             

12:55 Order name: Troponin (Emerg Dept Use Only)                                              EDMS

10/26                                                                                             

12:55 Order name: NT PRO-BNP                                                                  EDMS

10/26                                                                                             

12:55 Order name: Magnesium                                                                   EDMS

10/26                                                                                             

12:55 Order name: CBC with Automated Diff                                                     EDMS

10/26                                                                                             

08:01 Order name: XRAY Chest (1 view)                                                         bp  

10/26                                                                                             

08:01 Order name: EKG; Complete Time: 12:47                                                   bp  

10/26                                                                                             

08:01 Order name: Cardiac monitoring; Complete Time: 08:                                    bp  

10/26                                                                                             

08:01 Order name: EKG - Nurse/Tech; Complete Time: 08:                                      bp  

10/26                                                                                             

08:01 Order name: IV Saline Lock; Complete Time: 08:                                        bp  

10/26                                                                                             

08:01 Order name: Labs collected and sent; Complete Time: 08:                               bp  

10/26                                                                                             

08:01 Order name: O2 Per Protocol; Complete Time: 08:                                       bp  

10/26                                                                                             

08:01 Order name: O2 Sat Monitoring; Complete Time: 08:                                     bp  

10/26                                                                                             

08:01 Order name: CT Head Brain wo Cont                                                       bp  

10/26                                                                                             

12:45 Order name: RAD; Complete Time: 12:45                                                   EDMS

10/26                                                                                             

12:45 Order name: CT; Complete Time: 12:45                                                    EDMS

10/26                                                                                             

12:55 Order name: Protime (+INR)                                                              EDMS

10/26                                                                                             

13:04 Order name: EKG Electrocardiogram                                                       EDMS

10/26                                                                                             

13:18 Order name: SARS-COV-2 RT PCR                                                           EDMS

                                                                                                  

EC:46 Rate is 64 beats/min. Rhythm is irregular, Sinus arrythmia. Left axis deviation noted.  jr8 

      QRS interval is prolonged at 150 msec. QT interval is normal. T waves are Inverted in       

      leads I, aVL, V1, V2. No ST changes noted. Clinical impression: Abnormal EKG without        

      significant change. Interpreted by me. Reviewed by me.                                      

                                                                                                  

Administered Medications:                                                                         

No medications were administered                                                                  

                                                                                                  

                                                                                                  

Disposition:                                                                                      

16:29 Co-signature as Attending Physician, Jigar Flower MD.                                    rn  

16:29 I agree with the assessment and plan of care. PA/NP's history reviewed, patient         rn  

      interviewed, and examined. HPI: 71-year-old male presents with strokelike symptoms of       

      word salad and difficulty speaking along with confusion. Denies any sort of trauma.         

      Patient has LVAD. No new medication. My personal exam of patient reveals: 71-year-old       

      male, no acute distress, appears slightly confused but otherwise normal neurological        

      exam that is nonlateralizing. I agree with assessment and care plan and confirm the         

      diagnosis (es) above.                                                                       

                                                                                                  

Disposition Summary:                                                                              

10/26/21 12:20                                                                                    

Transfer Ordered                                                                                  

      Transfer Location: [facility]                                                     jr8 

      Reason: Higher level of care                                                            jr8 

      Condition: Stable                                                                       jr8 

      Problem: new                                                                            jr8 

      Symptoms: have improved                                                                 jr8 

      Accepting Physician: Dr. Torrez(10/26/21 13:50)                                      bp  

      Diagnosis                                                                                   

        - Transient cerebral ischemic attack, unspecified                                     jr8 

      Forms:                                                                                      

        - Medication Reconciliation Form                                                      jr8 

        - SBAR form                                                                           jr8 

                                                                                                  

NIH Stroke Scale - NIH Stroke Score                                                               

Date: 10/26/2021                                                                                  

Time: 08:07                                                                                       

Total Score = 1                                                                                   

  1a. Level of Consciousness (LOC) - 0(Alert)                                                     

  1b. Level of Consciousness (LOC) (Month \T\ Age) - 1(One)                                       

  1c. LOC Commands (Open \T\ Closes Eyes/) - 0(Both)                                          

   2. Best Gaze (Lateral Gaze Paresis) - 0(Normal)                                                

   3. Visual Field Loss - 0(No visual loss)                                                       

   4. Facial Palsy - 0(Normal)                                                                    

  5a. Left Arm: Motor (10-second hold) - 0(No drift)                                              

  5b. Right Arm: Motor (10-second hold) - 0(No drift)                                             

  6a. Left Leg: Motor (5-second hold - always test supine) - 0(No drift)                          

  6b. Right Leg: Motor (5-second hold - always test supine) - 0(No drift)                         

   7. Limb Ataxia (finger/nose \T\ heel/shin - test with eyes open) - 0(Absent)                   

   8. Sensory Loss (pinprick arms/legs/face) - 0(Normal)                                          

   9. Best Language: Aphasia (description/naming/reading) - 0(No aphasia)                         

  10. Dysarthria (speech clarity - read or repeat words) - 0(Normal)                              

  11. Extinction and Inattention (visual/tactile/auditory/spatial/personal) - 0(No                

      abnormality)                                                                                

Initials: jrDilia                                                                                     

                                                                                                  

Signatures:                                                                                       

Dispatcher [employer]                           EDMS                                                 

Jigar Flower MD MD rn Roszak, Josh, PA PA   jr8                                                  

Lee Reilly RN                        RN   jl7                                                  

Lasha Subramanian, RN                      RN   bp                                                   

                                                                                                  

Corrections: (The following items were deleted from the chart)                                    

13:50 12:20 Dr. Torrez jr8                                                         bp          

                                                                                                  

**************************************************************************************************

## 2021-10-26 NOTE — ER
Nurse's Notes                                                                                     

 [facility]                                                                 

Name: Tu Marie                                                                                 

Age: 71 yrs                                                                                       

Sex: Male                                                                                         

: 1950                                                                                   

MRN: [id number]                                                                                   

Arrival Date: 10/26/2021                                                                          

Time: 07:47                                                                                       

Account#: [id number]                                                                            

Bed 4                                                                                             

Private MD: KEREN Horowitz C                                                                            

Diagnosis: Transient cerebral ischemic attack, unspecified                                        

                                                                                                  

Presentation:                                                                                     

10/26                                                                                             

07:45 Chief complaint: EMS states: ACUTE CONFUSION FOR 15-20 MINUTE, S/S NOW RESOLVED.        bp  

      Coronavirus screen: At this time, the client does not indicate any symptoms associated      

      with coronavirus-19. Ebola Screen: No symptoms or risks identified at this time.            

07:45 Method Of Arrival: EMS: [facility]                                                bp  

07:45 Initial Sepsis Screen: Does the patient meet any 2 criteria? No. Patient's initial      bp  

      sepsis screen is negative. Does the patient have a suspected source of infection? No.       

      Patient's initial sepsis screen is negative. Risk Assessment: Do you want to hurt           

      yourself or someone else? Patient reports no desire to harm self or others. Onset of        

      symptoms was 2021 at 06:30. Care prior to arrival: Glucose check: 154.          

07:45 Acuity: GERMAN 3                                                                           bp  

07:45 Method Of Arrival: EMS: [facility]                                                bp  

                                                                                                  

Triage Assessment:                                                                                

07:45 General: Appears in no apparent distress. comfortable, slender, well groomed, Behavior  bp  

      is calm, cooperative, appropriate for age. Pain: Denies pain. EENT: No deficits noted.      

      Neuro: Level of Consciousness is awake, alert, obeys commands. Cardiovascular: No           

      deficits noted. Respiratory: No deficits noted. GI: No signs and/or symptoms were           

      reported involving the gastrointestinal system. : No signs and/or symptoms were           

      reported regarding the genitourinary system. Derm: No deficits noted. Musculoskeletal:      

      No deficits noted.                                                                          

                                                                                                  

Historical:                                                                                       

- Allergies:                                                                                      

07:48 Codeine;                                                                                jl7 

07:48 Fentanyl;                                                                               jl7 

07:48 Xanax;                                                                                  jl7 

- Home Meds:                                                                                      

07:48 digoxin 125 mcg (0.125 mg) Oral tab 1 tab once daily [Active]; fenofibrate 150 mg Oral  jl7 

      cap 1 cap once daily [Active]; ferrous sulfate 325 mg (65 mg iron) Oral tab daily           

      [Active]; Lantus 100 unit/mL Sub-Q soln 26 unit daily [Active]; liraglutide                 

      subcutaneous 0.3 mL once daily [Active]; losartan 25 mg oral tab 2 tabs once daily          

      [Active]; Multiple Vitamins Oral tab daily [Active]; Protonix 40 mg Oral TbEC 1 tab         

      once daily [Active]; rosuvastatin 5 mg Oral tab 1 tab once daily [Active]; Senokot S        

      Oral [Active]; spironolactone 25 mg Oral tab 0.5 tab once daily [Active]; tamsulosin        

      0.4 mg Oral cp24 1 cap once daily [Active]; warfarin oral once daily [Active];              

- PMHx:                                                                                           

07:48 CAD; CHF; Diabetes - IDDM; Hyperlipidemia; Hypertension;                                jl7 

- PSHx:                                                                                           

07:48 LVAD;                                                                                   jl7 

                                                                                                  

- Immunization history:: Adult Immunizations up to date, Client reports receiving the             

  2nd dose of the Covid vaccine.                                                                  

- Social history:: Smoking status: Patient denies any tobacco usage or history of.                

                                                                                                  

                                                                                                  

Screenin:45 Abuse screen: Denies threats or abuse. Denies injuries from another. Nutritional        bp  

      screening: No deficits noted. Tuberculosis screening: No symptoms or risk factors           

      identified. Fall Risk None identified.                                                      

                                                                                                  

Assessment:                                                                                       

07:45 General: SEE TRIAGE NOTE.                                                               bp  

08:54 Reassessment: PT RETURNED FROM CT. Neuro: Level of Consciousness is awake, alert, obeys bp  

      commands, Oriented to person, place, time, situation.                                       

11:02 Reassessment: No changes from previously documented assessment. Patient and/or family   bp  

      updated on plan of care and expected duration. Pain level reassessed. Patient is alert,     

      oriented x 3, equal unlabored respirations, skin warm/dry/pink. ALL CURRENT ORDERS          

      COMPLETE, RESULTS PENDING.                                                                  

12:05 Reassessment: No changes from previously documented assessment. Patient and/or family   bp  

      updated on plan of care and expected duration. Pain level reassessed. [facility]             

      TRANSFER INITIATED.                                                                         

13:10 Reassessment: REPORT TO SHAILA GUO FOR [facility]. TRANSPORT PENDING.                bp  

13:38 Reassessment: MEET EMS AT B/S.                                                            bp  

                                                                                                  

Vital Signs:                                                                                      

07:45  / 79; Pulse 68; Resp 17; Temp 98; Pulse Ox 100% ; Weight 71.67 kg; Height 5 ft.  bp  

      7 in. (170.18 cm);                                                                          

08:55  / 79; Pulse 60; Resp 16; Pulse Ox 98% ;                                          bp  

09:40  / 82; Pulse 65; Resp 19; Pulse Ox 100% on R/A;                                   ap3 

11:02  / 81; Pulse 66; Resp 21; Pulse Ox 96% ;                                          bp  

12:05  / 78; Pulse 63; Resp 17; Pulse Ox 100% ;                                         bp  

13:10  / 82; Pulse 63; Resp 18; Pulse Ox 99% ;                                          bp  

07:45 Body Mass Index 24.75 (71.67 kg, 170.18 cm)                                             bp  

                                                                                                  

NIH Stroke Scale Scores:                                                                          

08:07 NIHSS Score: 1                                                                          jr8 

                                                                                                  

ED Course:                                                                                        

07:45 Arm band placed on.                                                                     bp  

07:45 Patient has correct armband on for positive identification. Allergy band placed. Bed in bp  

      low position. Call light in reach. Side rails up X2.                                        

07:47 Patient arrived in ED.                                                                  as  

07:47 KEREN Horowitz MD is Private Physician.                                                       as  

07:49 Inserted saline lock: 20 gauge in right antecubital area, using aseptic technique.      ap3 

      Blood collected.                                                                            

07:56 Lasha Subramanian RN is Primary Nurse.                                                    bp  

07:56 EKG done, by ED staff, reviewed by Aly GRANDE.                                      em1 

07:59 Aly Qureshi PA is PHCP.                                                               jr8 

07:59 Jigar Flower MD is Attending Physician.                                                jr8 

08:05 Triage completed.                                                                       bp  

11:40 initiated transfer to [facility].                                                    bd  

12:26 Neurologist Agueda GONZALEZ CALLED FROM [facility] TRANSFER WITH ADMINISTRATION APPROVAL   bd  

      \[id number] NOTIFIED LASHA GUO TO GIVE REPORT.                                                     

13:39 No provider procedures requiring assistance completed. Patient transferred, IV remains  bp  

      in place.                                                                                   

                                                                                                  

Administered Medications:                                                                         

No medications were administered                                                                  

                                                                                                  

                                                                                                  

Outcome:                                                                                          

12:20 ER care complete, transfer ordered by MD.                                               jr8 

13:40 Transferred by ground EMS to [facility].                                    bp  

13:40 Condition: stable                                                                           

13:40 Instructed on the need for transfer.                                                        

13:50 Patient left the ED.                                                                    bp  

                                                                                                  

                                                                                                  

NIH Stroke Scale - NIH Stroke Score                                                               

Date: 10/26/2021                                                                                  

Time: 08:07                                                                                       

Total Score = 1                                                                                   

  1a. Level of Consciousness (LOC) - 0(Alert)                                                     

  1b. Level of Consciousness (LOC) (Month \T\ Age) - 1(One)                                       

  1c. LOC Commands (Open \T\ Closes Eyes/) - 0(Both)                                          

   2. Best Gaze (Lateral Gaze Paresis) - 0(Normal)                                                

   3. Visual Field Loss - 0(No visual loss)                                                       

   4. Facial Palsy - 0(Normal)                                                                    

  5a. Left Arm: Motor (10-second hold) - 0(No drift)                                              

  5b. Right Arm: Motor (10-second hold) - 0(No drift)                                             

  6a. Left Leg: Motor (5-second hold - always test supine) - 0(No drift)                          

  6b. Right Leg: Motor (5-second hold - always test supine) - 0(No drift)                         

   7. Limb Ataxia (finger/nose \T\ heel/shin - test with eyes open) - 0(Absent)                   

   8. Sensory Loss (pinprick arms/legs/face) - 0(Normal)                                          

   9. Best Language: Aphasia (description/naming/reading) - 0(No aphasia)                         

  10. Dysarthria (speech clarity - read or repeat words) - 0(Normal)                              

  11. Extinction and Inattention (visual/tactile/auditory/spatial/personal) - 0(No                

      abnormality)                                                                                

Initials: jr8                                                                                     

                                                                                                  

Signatures:                                                                                       

Elizabeth Kim, Griselda Peters, Les                               em1                                                  

Aly Qureshi PA PA   jr8                                                  

Lee Reilly RN                        RN   jl7                                                  

Lasha Subramanian, BRANT GUO   bp                                                   

Jaki Walters RN                    RN   ap3                                                  

                                                                                                  

**************************************************************************************************

## 2021-10-26 NOTE — RAD REPORT
EXAM DESCRIPTION:  CT - Head Brain Wo Cont - 10/26/2021 8:44 am

 

CLINICAL HISTORY:  Dizziness

 

COMPARISON:  2015

 

TECHNIQUE:  Computed axial tomography of the head was obtained. IV contrast was not requested.

 

All CT scans are performed using dose optimization technique as appropriate and may include automated
 exposure control or mA/KV adjustment according to patient size.

 

FINDINGS:  An intracranial  bleed is not seen .

 

The ventricles are normal in caliber.

 

No extra-axial fluid collection is noted.

 

Moderate low-density areas within periventricular, deep and subcortical white matter likely represent
 ischemic changes secondary to small vessel disease.

 

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

 

Fluid within the sinuses/ mastoids is not seen. Chronic sphenoid sinusitis

 

IMPRESSION:  No acute intracranial abnormality is seen. If patient's symptoms persist  MRI of the bra
in would be recommended.

## 2021-10-26 NOTE — EKG
Test Date:    2021-10-26               Test Time:    07:52:10

Technician:   IZABELLA                                    

                                                     

MEASUREMENT RESULTS:                                       

Intervals:                                           

Rate:         64                                     

IN:                                                  

QRSD:         150                                    

QT:           420                                    

QTc:          433                                    

Axis:                                                

P:                                                   

IN:                                                  

QRS:          -57                                    

T:            121                                    

                                                     

INTERPRETIVE STATEMENTS:                                       

                                                     

Wide QRS rhythm with frequent and consecutive premature ventricular complexes

with ventricular escape 

complexes

Left axis deviation

Left ventricular hypertrophy with QRS widening and repolarization abnormality

Lateral infarct, age undetermined

Inferior infarct, age undetermined

Abnormal ECG

Compared to ECG 03/05/2019 22:33:04

Uncertain supraventricular rhythm now present

Ventricular escape complex(es) now present

Ventricular premature complex(es) now present

Left-axis deviation now present

Left ventricular hypertrophy now present







Electronically Signed On 10-26-21 12:12:41 CDT by Kelby Alarcon

## 2022-06-07 ENCOUNTER — HOSPITAL ENCOUNTER (EMERGENCY)
Dept: HOSPITAL 97 - ER | Age: 72
LOS: 1 days | Discharge: HOME | End: 2022-06-08
Payer: COMMERCIAL

## 2022-06-07 DIAGNOSIS — I50.42: Primary | ICD-10-CM

## 2022-06-07 DIAGNOSIS — Z79.4: ICD-10-CM

## 2022-06-07 DIAGNOSIS — Z88.5: ICD-10-CM

## 2022-06-07 DIAGNOSIS — I10: ICD-10-CM

## 2022-06-07 DIAGNOSIS — E11.9: ICD-10-CM

## 2022-06-07 DIAGNOSIS — Z20.822: ICD-10-CM

## 2022-06-07 DIAGNOSIS — Z79.01: ICD-10-CM

## 2022-06-07 DIAGNOSIS — I25.10: ICD-10-CM

## 2022-06-07 PROCEDURE — 81015 MICROSCOPIC EXAM OF URINE: CPT

## 2022-06-07 PROCEDURE — 80048 BASIC METABOLIC PNL TOTAL CA: CPT

## 2022-06-07 PROCEDURE — 87086 URINE CULTURE/COLONY COUNT: CPT

## 2022-06-07 PROCEDURE — 85610 PROTHROMBIN TIME: CPT

## 2022-06-07 PROCEDURE — 83880 ASSAY OF NATRIURETIC PEPTIDE: CPT

## 2022-06-07 PROCEDURE — 36415 COLL VENOUS BLD VENIPUNCTURE: CPT

## 2022-06-07 PROCEDURE — 82947 ASSAY GLUCOSE BLOOD QUANT: CPT

## 2022-06-07 PROCEDURE — 80076 HEPATIC FUNCTION PANEL: CPT

## 2022-06-07 PROCEDURE — 85025 COMPLETE CBC W/AUTO DIFF WBC: CPT

## 2022-06-07 PROCEDURE — 81003 URINALYSIS AUTO W/O SCOPE: CPT

## 2022-06-07 PROCEDURE — 71045 X-RAY EXAM CHEST 1 VIEW: CPT

## 2022-06-07 PROCEDURE — 84484 ASSAY OF TROPONIN QUANT: CPT

## 2022-06-07 PROCEDURE — 83605 ASSAY OF LACTIC ACID: CPT

## 2022-06-07 PROCEDURE — 93005 ELECTROCARDIOGRAM TRACING: CPT

## 2022-06-07 PROCEDURE — 83735 ASSAY OF MAGNESIUM: CPT

## 2022-06-07 PROCEDURE — 87804 INFLUENZA ASSAY W/OPTIC: CPT

## 2022-06-07 PROCEDURE — 70450 CT HEAD/BRAIN W/O DYE: CPT

## 2022-06-07 PROCEDURE — 87088 URINE BACTERIA CULTURE: CPT

## 2022-06-07 PROCEDURE — 99284 EMERGENCY DEPT VISIT MOD MDM: CPT

## 2022-06-07 PROCEDURE — 87040 BLOOD CULTURE FOR BACTERIA: CPT

## 2022-06-07 NOTE — RAD REPORT
EXAM DESCRIPTION:  CT - Head Brain Wo Cont - 6/7/2022 9:57 pm

 

CLINICAL HISTORY:  Mental status change, unknown cause

Headache, drowsiness

 

COMPARISON:  Head Brain Wo Cont dated 10/26/2021; HEAD BRAIN W O CONTRAST dated 2/27/2015

 

TECHNIQUE:  All CT scans are performed using dose optimization technique as appropriate and may inclu
de automated exposure control or mA/KV adjustment according to patient size.

 

FINDINGS:  No intracranial hemorrhage, hydrocephalus or extra-axial fluid collection.Mild generalized
 brain atrophy is present with moderate periventricular and deep white matter chronic microvascular i
schemic changes.Old left occipital lobe infarct suspected.

 

Chronic sphenoid sinusitis. The calvarium is intact. Aortic atherosclerosis.

 

IMPRESSION:  No acute intracranial abnormality.

## 2022-06-07 NOTE — RAD REPORT
EXAM DESCRIPTION:  RAD - Chest Single View - 6/7/2022 10:19 pm

 

CLINICAL HISTORY:  AMS

Chest pain.

 

COMPARISON:  Chest Single View dated 10/26/2021; Chest Single View dated 3/5/2019; CHEST SINGLE VIEW 
dated 9/10/2014; CHEST SINGLE VIEW dated 6/1/2014

 

FINDINGS:  Portable technique limits examination quality.

 

Chronic elevation the right hemidiaphragm is noted. Mild atelectasis is noted in the right lung base.
 Mild interstitial pulmonary edema suspected. The heart is moderately enlarged in size with multilead
 pacer device present. Postsurgical changes are present.

 

IMPRESSION:  Mild CHF.

## 2022-06-08 VITALS — OXYGEN SATURATION: 96 % | SYSTOLIC BLOOD PRESSURE: 136 MMHG | DIASTOLIC BLOOD PRESSURE: 88 MMHG

## 2022-06-08 VITALS — TEMPERATURE: 98.2 F

## 2022-06-08 LAB
ALBUMIN SERPL BCP-MCNC: 3.3 G/DL (ref 3.4–5)
ALP SERPL-CCNC: 125 U/L (ref 45–117)
ALT SERPL W P-5'-P-CCNC: 34 U/L (ref 12–78)
AST SERPL W P-5'-P-CCNC: 14 U/L (ref 15–37)
BUN BLD-MCNC: 32 MG/DL (ref 7–18)
GLUCOSE SERPLBLD-MCNC: 266 MG/DL (ref 74–106)
HCT VFR BLD CALC: 29.8 % (ref 39.6–49)
INR BLD: 1.73
LYMPHOCYTES # SPEC AUTO: 1.1 K/UL (ref 0.7–4.9)
MAGNESIUM SERPL-MCNC: 1.7 MG/DL (ref 1.8–2.4)
NT-PROBNP SERPL-MCNC: 2650 PG/ML (ref ?–125)
PMV BLD: 7.3 FL (ref 7.6–11.3)
POTASSIUM SERPL-SCNC: 4.3 MMOL/L (ref 3.5–5.1)
RBC # BLD: 3.57 M/UL (ref 4.33–5.43)
TROPONIN I SERPL HS-MCNC: 27.4 PG/ML (ref ?–58.9)

## 2022-06-08 NOTE — EKG
Test Date:    2022-06-07               Test Time:    22:12:23

Technician:   ANGELES                                     

                                                     

MEASUREMENT RESULTS:                                       

Intervals:                                           

Rate:         68                                     

ID:                                                  

QRSD:         130                                    

QT:           440                                    

QTc:          467                                    

Axis:                                                

P:                                                   

ID:                                                  

QRS:          -55                                    

T:            118                                    

                                                     

INTERPRETIVE STATEMENTS:                                       

                                                     

Wide QRS rhythm with premature supraventricular complexes and with frequent

premature ventricular complexes

Left axis deviation

Nonspecific intraventricular block

Inferior infarct, age undetermined

Anterolateral infarct, age undetermined

Abnormal ECG

Compared to ECG 10/26/2021 07:52:10

Atrial premature complex(es) now present

Ventricular escape complex(es) no longer present

Left ventricular hypertrophy no longer present

Early repolarization no longer present

Myocardial infarct finding still present



Electronically Signed On 06-08-22 07:52:26 CDT by Kelby Alarcon

## 2022-06-08 NOTE — ER
Nurse's Notes                                                                                     

 [facility]                                                                 

Name: Tu Marie                                                                                 

Age: 71 yrs                                                                                       

Sex: Male                                                                                         

: 1950                                                                                   

MRN: [id number]                                                                                   

Arrival Date: 2022                                                                          

Time: 21:27                                                                                       

Account#: [id number]                                                                            

Bed 14                                                                                            

Private MD:                                                                                       

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  

  heart failure                                                                                   

                                                                                                  

Presentation:                                                                                     

                                                                                             

21:24 Acuity: GERMAN 2                                                                           lp1 

21:24 Risk Assessment: Do you want to hurt yourself or someone else? Patient reports no       lp1 

      desire to harm self or others. Onset of symptoms was 2022 at 19:30. Care prior     

      to arrival: IV initiated. 18 GA, in the right forearm, Glucose check: 288.                  

21:24 Chief complaint: EMS states: Called by patient's wife who reported patient appeared     lp1 

      confused, beginning at 1930 tonight; Hx of CVA. Coronavirus screen: At this time, the       

      client does not indicate any symptoms associated with coronavirus-19. Ebola Screen: No      

      symptoms or risks identified at this time. Initial Sepsis Screen: Does the patient meet     

      any 2 criteria? No. Patient's initial sepsis screen is negative. Does the patient have      

      a suspected source of infection? No. Patient's initial sepsis screen is negative.           

21:24 Method Of Arrival: EMS: [facility] 

                                                                                                  

Historical:                                                                                       

- Allergies:                                                                                      

23:00 Codeine;                                                                                lp1 

23:00 Fentanyl;                                                                               lp1 

23:00 Xanax;                                                                                  lp1 

- Home Meds:                                                                                      

                                                                                             

00:48 digoxin 125 mcg (0.125 mg) Oral tab 1 tab once daily [Active]; ferrous sulfate 325 mg   lp1 

      (65 mg iron) Oral tab daily [Active]; losartan 100 mg oral tab once daily [Active];         

      Protonix 40 mg Oral TbEC 1 tab 2 times per day [Active]; spironolactone 25 mg Oral tab      

      0.5 tab once daily [Active]; tamsulosin 0.4 mg Oral cp24 1 cap once daily [Active];         

      Warfarin Oral [Active]; Multiple Vitamins Oral tab daily [Active]; Lantus 100 unit/mL       

      Sub-Q soln 18 unit daily [Active]; Victoza 2-Rj subcutaneous once daily [Active];          

      Insulin: Novolin R Sub-Q [Active];                                                          

- PMHx:                                                                                           

                                                                                             

23:00 CAD; CHF; Diabetes - IDDM; Hyperlipidemia; Hypertension;                                lp1 

- PSHx:                                                                                           

23:00 LVAD;                                                                                   lp1 

                                                                                                  

- Immunization history:: Adult Immunizations up to date.                                          

- Social history:: Smoking status: Patient denies any tobacco usage or history of.                

                                                                                                  

                                                                                                  

Screenin/08                                                                                             

00:47 Abuse screen: Denies threats or abuse. Denies injuries from another. Nutritional        lp1 

      screening: No deficits noted. Tuberculosis screening: No symptoms or risk factors           

      identified. Fall Risk Total Pichardo Fall Scale indicates High Risk Score (45 or more          

      points). Fall prevention measures have been instituted. Side Rails Up X 2 Family            

      Present and informed to notify staff if the need to leave the bedside As available          

      patient and family educated on Fall Prevention Program and Strategies.                      

                                                                                                  

Assessment:                                                                                       

                                                                                             

21:30 Reassessment: Patient to CT.                                                            lp1 

22:00 General: Appears in no apparent distress. Behavior is calm, cooperative. Pain:          lp1 

      Complains of pain in back, Hx of chronic back pain from Scoliosis per wife Pain             

      currently is 7 out of 10 on a pain scale. Quality of pain is described as aching,           

      Alleviated by repositioning. Neuro: Level of Consciousness is awake, alert, obeys           

      commands, Oriented to person, place, situation,  are equal bilaterally Moves all       

      extremities. Full function Speech is normal, Facial symmetry appears normal, Pupils are     

      PERRLA, Intact. Cardiovascular: Patient's skin is warm and dry. LVAD connected.             

      Respiratory: Airway is patent Respiratory effort is even, unlabored. GI: No signs           

      and/or symptoms were reported involving the gastrointestinal system. : Martinez in place     

      to gravity drainage. EENT: No signs and/or symptoms were reported regarding the EENT        

      system. Derm: Skin is intact, is thin, Skin is dry, Skin is normal. Musculoskeletal:        

      Circulation, motion, and sensation intact.                                                  

                                                                                             

00:00 Reassessment: Patient appears in no apparent distress at this time. Patient laying on   lp1 

      side for comfort; Aware of waiting for results, wife at bedside.                            

03:00 Reassessment: Patient is alert, oriented x 3, equal unlabored respirations, skin        lp1 

      warm/dry/pink. Assisted patient into wheelchair for discharge; Patient's wife               

      demonstrates understanding on discharge instructions for follow-up with PCP for any         

      further concerns.                                                                           

                                                                                                  

Vital Signs:                                                                                      

                                                                                             

21:24  / 83; Pulse 66; Resp 20; Temp 98.2(O); Pulse Ox 96% on R/A; Weight 66.68 kg (R); lp1 

      Height 5 ft. 7 in. (170.18 cm); Pain 7/10;                                                  

22:30  / 73; Pulse 64; Resp 17; Pulse Ox 98% on R/A;                                    lp1 

23:30  / 76; Pulse 68; Resp 14; Pulse Ox 97% on R/A;                                    lp1 

                                                                                             

00:30  / 83; Pulse 80; Resp 21; Pulse Ox 96% on R/A;                                    lp1 

00:53  / 96; Pulse 67; Resp 16; Pulse Ox 97% on R/A;                                    lp1 

02:00  / 88; Pulse 63; Resp 20; Pulse Ox 96% on R/A;                                    lp1 

                                                                                             

21:24 Body Mass Index 23.02 (66.68 kg, 170.18 cm)                                             lp1 

                                                                                                  

NIH Stroke Scale Scores:                                                                          

                                                                                             

21:40 NIHSS Score: 1                                                                          lp1 

                                                                                                  

ED Course:                                                                                        

21:27 Patient arrived in ED.                                                                  [id number] 

21:30 Arm band placed on left wrist.                                                          lp1 

21:32 Hakan Mendoza MD is Attending Physician.                                             [id number] 

21:40 Patient has correct armband on for positive identification. Placed in gown. Bed in low  lp1 

      position. Side rails up X2. Client placed on continuous cardiac and pulse oximetry          

      monitoring. NIBP monitoring applied.                                                        

21:45 Maintain EMS IV. Dressing intact. Good blood return noted. Site clean \T\ dry. Gauge \T\    lp
1

      site: 18g to R FA.                                                                          

21:45 Initial lab(s) drawn, by me, sent to lab.                                               lp1 

21:51 Serena Valdez, RN is Primary Nurse.                                                       lp1 

21:55 Triage completed.                                                                       lp1 

21:59 CT Head Brain wo Cont In Process Unspecified.                                           EDMS

22:12 EKG done, by ED staff, reviewed by Hakan Mendoza MD.                                   lp1 

22:21 XRAY Chest (1 view) In Process Unspecified.                                             EDMS

22:28 Urine collected: Martinez catheter specimen, clear.                                        lp1 

23:04 Blood Culture Adult (2) Sent.                                                           zm  

23:05 Lactate Sent.                                                                           zm  

23:05 Influenza Screen (a \T\ B) Sent.                                                          zm

23:05 COVID-19 SARS RT PCR (Document "Date of Onset" if Symptomatic) Sent.                    zm  

                                                                                             

00:47 No provider procedures requiring assistance completed.                                  lp1 

03:04 IV discontinued, No redness/swelling at site. Pressure dressing applied.                lp1 

                                                                                                  

Administered Medications:                                                                         

No medications were administered                                                                  

                                                                                                  

                                                                                                  

Medication:                                                                                       

                                                                                             

21:55 VIS not applicable for this client.                                                     lp1 

                                                                                                  

Point of Care Testing:                                                                            

      Blood Glucose:                                                                              

21:31 Blood Glucose: 254 mg/dL;                                                               lp1 

      Ranges:                                                                                     

                                                                                                  

Output:                                                                                           

                                                                                             

03:05 Urine: 900ml (Martinez); Total: 900ml.                                                     lp1 

                                                                                                  

Outcome:                                                                                          

02:17 Discharge ordered by MD.                                                                7 

03:04 Discharged to home via wheelchair, with significant other.                              lp1 

03:04 Condition: good                                                                             

03:04 Discharge instructions given to patient, Instructed on discharge instructions, follow       

      up and referral plans. Demonstrated understanding of instructions, follow-up care.          

03:07 Patient left the ED.                                                                    lp1 

                                                                                                  

                                                                                                  

NIH Stroke Scale - NIH Stroke Score                                                               

Date: 2022                                                                                  

Time: 21:40                                                                                       

Total Score = 1                                                                                   

  1a. Level of Consciousness (LOC) - 0(Alert)                                                     

  1b. Level of Consciousness (LOC) (Month \T\ Age) - 0(Both)                                      

  1c. LOC Commands (Open \T\ Closes Eyes/) - 0(Both)                                          

   2. Best Gaze (Lateral Gaze Paresis) - 0(Normal)                                                

   3. Visual Field Loss - 0(No visual loss)                                                       

   4. Facial Palsy - 0(Normal)                                                                    

  5a. Left Arm: Motor (10-second hold) - 0(No drift)                                              

  5b. Right Arm: Motor (10-second hold) - 0(No drift)                                             

  6a. Left Leg: Motor (5-second hold - always test supine) - 0(No drift)                          

  6b. Right Leg: Motor (5-second hold - always test supine) - 0(No drift)                         

   7. Limb Ataxia (finger/nose \T\ heel/shin - test with eyes open) - 0(Absent)                   

   8. Sensory Loss (pinprick arms/legs/face) - 0(Normal)                                          

   9. Best Language: Aphasia (description/naming/reading) - 1(Mild to moderate                    

      aphasia)                                                                                    

  10. Dysarthria (speech clarity - read or repeat words) - 0(Normal)                              

  11. Extinction and Inattention (visual/tactile/auditory/spatial/personal) - 0(No                

      abnormality)                                                                                

Initials: lp1                                                                                     

                                                                                                  

Signatures:                                                                                       

Dispatcher [employer]                           Serena Shelton RN                         RN   lp1                                                  

Sharona Crowder                            2                                                  

Hakan Mendoza MD MD   7                                                  

Trupti Peters                                                   

                                                                                                  

Corrections: (The following items were deleted from the chart)                                    

00:47  21:30  / 83; Pulse 66bpm; Resp 20bpm; Pulse Ox 96% RA; lp1          lp1         

                                                                                             

01:25  19:30 Reassessment: Patient to CT lp1                                     lp1         

                                                                                                  

**************************************************************************************************

## 2022-06-08 NOTE — EDPHYS
Physician Documentation                                                                           

 [facility]                                                                 

Name: Tu Marie                                                                                 

Age: 71 yrs                                                                                       

Sex: Male                                                                                         

: 1950                                                                                   

MRN: [id number]                                                                                   

Arrival Date: 2022                                                                          

Time: 21:27                                                                                       

Account#: [id number]                                                                            

Bed 14                                                                                            

Private MD:                                                                                       

ED Physician Hakan Mendoza                                                                      

HPI:                                                                                              

                                                                                             

21:40 This 71 yrs old Male presents to ER via Unassigned with complaints of Confusion.        mh7 

21:40 The patient presents with confusion. Onset: The symptoms/episode began/occurred today,  mh7 

      at 19:30. Possible causes: UTI. Associated signs and symptoms: Pertinent negatives:         

      abdominal pain, agitation, ataxia, blurred vision, chest pain, combativeness,               

      diaphoresis, diarrhea, dizziness, headache, lightheadedness, nausea, numbness,              

      palpitations, seizure, shortness of breath, tingling, vertigo, vomiting, weakness.          

      Current symptoms: In the emergency department the patient's symptoms have improved,         

      moderately. Patient's baseline: Neuro: alert and fully oriented, Motor: no deficits,        

      Ambulation: walks with assist only, uses walker, Speech: normal for age. Wife states        

      that this type of confusion has occurred in the past with UTI's. States that he             

      recently finished a course of antibiotics for UTI..                                         

                                                                                                  

Historical:                                                                                       

- Allergies:                                                                                      

23:00 Codeine;                                                                                lp1 

23:00 Fentanyl;                                                                               lp1 

23:00 Xanax;                                                                                  lp1 

- Home Meds:                                                                                      

                                                                                             

00:48 digoxin 125 mcg (0.125 mg) Oral tab 1 tab once daily [Active]; ferrous sulfate 325 mg   lp1 

      (65 mg iron) Oral tab daily [Active]; losartan 100 mg oral tab once daily [Active];         

      Protonix 40 mg Oral TbEC 1 tab 2 times per day [Active]; spironolactone 25 mg Oral tab      

      0.5 tab once daily [Active]; tamsulosin 0.4 mg Oral cp24 1 cap once daily [Active];         

      Warfarin Oral [Active]; Multiple Vitamins Oral tab daily [Active]; Lantus 100 unit/mL       

      Sub-Q soln 18 unit daily [Active]; Victoza 2-Rj subcutaneous once daily [Active];          

      Insulin: Novolin R Sub-Q [Active];                                                          

- PMHx:                                                                                           

                                                                                             

23:00 CAD; CHF; Diabetes - IDDM; Hyperlipidemia; Hypertension;                                lp1 

- PSHx:                                                                                           

23:00 LVAD;                                                                                   lp1 

                                                                                                  

- Immunization history:: Adult Immunizations up to date.                                          

- Social history:: Smoking status: Patient denies any tobacco usage or history of.                

                                                                                                  

                                                                                                  

ROS:                                                                                              

21:40 Constitutional: Negative for fever, chills, and weight loss, Eyes: Negative for injury, mh7 

      pain, redness, and discharge, ENT: Negative for injury, pain, and discharge, Neck:          

      Negative for injury, pain, and swelling, Cardiovascular: Negative for chest pain,           

      palpitations, and edema, Respiratory: Negative for shortness of breath, cough,              

      wheezing, and pleuritic chest pain, Abdomen/GI: Negative for abdominal pain, nausea,        

      vomiting, diarrhea, and constipation, : Negative for injury, bleeding, discharge, and     

      swelling, MS/Extremity: Negative for injury and deformity, Skin: Negative for injury,       

      rash, and discoloration, Neuro: Negative for headache, weakness, numbness, tingling,        

      and seizure, Psych: Negative for depression, anxiety, suicide ideation, homicidal           

      ideation, and hallucinations, Allergy/Immunology: Negative for hives, rash, and             

      allergies, Endocrine: Negative for neck swelling, polydipsia, polyuria, polyphagia, and     

      marked weight changes, Hematologic/Lymphatic: Negative for swollen nodes, abnormal          

      bleeding, and unusual bruising.                                                             

                                                                                                  

Exam:                                                                                             

21:40 Constitutional:  This is a well developed, well nourished patient who is awake, alert,  mh7 

      and in no acute distress. Head/Face:  Normocephalic, atraumatic. Eyes:  Pupils equal        

      round and reactive to light, extra-ocular motions intact.  Lids and lashes normal.          

      Conjunctiva and sclera are non-icteric and not injected.  Cornea within normal limits.      

      Periorbital areas with no swelling, redness, or edema. Neck:  Trachea midline, no           

      thyromegaly or masses palpated, and no cervical lymphadenopathy.  Supple, full range of     

      motion without nuchal rigidity, or vertebral point tenderness.  No Meningismus.             

      Chest/axilla:  Normal chest wall appearance and motion.  Nontender with no deformity.       

      No lesions are appreciated. Cardiovascular:  Regular rate and rhythm with a normal S1       

      and S2.  No gallops, murmurs, or rubs.  Normal PMI, no JVD.  No pulse deficits.             

      Respiratory:  Lungs have equal breath sounds bilaterally, clear to auscultation and         

      percussion.  No rales, rhonchi or wheezes noted.  No increased work of breathing, no        

      retractions or nasal flaring. Abdomen/GI:  Soft, non-tender, with normal bowel sounds.      

      No distension or tympany.  No guarding or rebound.  No evidence of tenderness               

      throughout. Back:  No spinal tenderness.  No costovertebral tenderness.  Full range of      

      motion. Skin:  Warm, dry with normal turgor.  Normal color with no rashes, no lesions,      

      and no evidence of cellulitis. MS/ Extremity:  Pulses equal, no cyanosis.                   

      Neurovascular intact.  Full, normal range of motion.                                        

                                                                                                  

Vital Signs:                                                                                      

21:24  / 83; Pulse 66; Resp 20; Temp 98.2(O); Pulse Ox 96% on R/A; Weight 66.68 kg (R); lp1 

      Height 5 ft. 7 in. (170.18 cm); Pain 7/10;                                                  

22:30  / 73; Pulse 64; Resp 17; Pulse Ox 98% on R/A;                                    lp1 

23:30  / 76; Pulse 68; Resp 14; Pulse Ox 97% on R/A;                                    lp1 

                                                                                             

00:30  / 83; Pulse 80; Resp 21; Pulse Ox 96% on R/A;                                    lp1 

00:53  / 96; Pulse 67; Resp 16; Pulse Ox 97% on R/A;                                    lp1 

02:00  / 88; Pulse 63; Resp 20; Pulse Ox 96% on R/A;                                    lp1 

                                                                                             

21:24 Body Mass Index 23.02 (66.68 kg, 170.18 cm)                                             lp1 

                                                                                                  

NIH Stroke Scale Scores:                                                                          

                                                                                             

21:40 NIHSS Score: 1                                                                          lp1 

                                                                                                  

MDM:                                                                                              

                                                                                             

02:09 Differential Diagnosis: CVA, electrolyte abnormality, alcohol intoxication,             mh7 

      hypoglycemia, intracranial bleed, seizure, sepsis, TIA, UTI, volume depletion. Data         

      reviewed: vital signs, nurses notes, old medical records, lab test result(s), cardiac       

      enzymes, CBC, electrolytes, urinalysis, EKG, radiologic studies, CT scan, plain films.      

      Data interpreted: Pulse oximetry: on room air is 97 %. Interpretation: normal.              

      Counseling: I had a detailed discussion with the patient and/or guardian regarding: the     

      historical points, exam findings, and any diagnostic results supporting the                 

      discharge/admit diagnosis, lab results, radiology results, to return to the emergency       

      department if symptoms worsen or persist or if there are any questions or concerns that     

      arise at home. Response to treatment: the patient's symptoms have resolved after            

      treatment, the patient's blood pressure is in an acceptable range, mental status has        

      returned to baseline, the patient no longer shows bradycardia, the patient is not short     

      of breath, the patient is not tachycardic, the patient's pain is gone, the patient's        

      temperature has normalized, the patient is now symptom free, patient is well hydrated.      

      Refusal of service: The patient/guardian displays adequate decision making capability       

      and despite a detailed discussion of alternatives, benefits, risks, and consequences        

      refuses: Admission to the hospital for further work-up and treatment, Medications. ED       

      course: Feels better, NAD, VSS, no focal neurological deficits. Awake, alert and            

      oriented x3, tolerating PO intake. Discussed all test results and findings with the         

      patient and his wife and recommended admission or transfer for further evaluation. They     

      declined admission or transfer and request to be discharged from the ED. They feel they     

      can manage his medical issues at home and will call his doctors in the morning for          

      follow up. They know they can return if any urgent concerns..                               

02:17 Patient medically screened.                                                             [facility] 

                                                                                                  

                                                                                             

21:43 Order name: Glucose, Ancillary Testing; Complete Time: 21:48                            [facility]

                                                                                             

21:50 Order name: Basic Metabolic Panel; Complete Time: :                                 [facility] 

                                                                                             

21:50 Order name: CBC with Diff; Complete Time: 00:38                                         [facility] 

                                                                                             

21:50 Order name: LFT's; Complete Time: :                                                 [facility] 

                                                                                             

21:50 Order name: Magnesium; Complete Time: :                                             [facility] 

                                                                                             

21:50 Order name: NT PRO-BNP; Complete Time: :                                            [facility] 

                                                                                             

21:50 Order name: PT-INR; Complete Time: 00:38                                                [facility] 

                                                                                             

21:50 Order name: Troponin HS; Complete Time: 01:                                           [facility] 

                                                                                             

21:50 Order name: Urine Microscopic Only; Complete Time: 23:33                                [facility] 

                                                                                             

21:50 Order name: Urine Culture                                                               [facility] 

                                                                                             

22:12 Order name: COVID-19 SARS RT PCR (Document "Date of Onset" if Symptomatic); Complete    [facility] 

      Time: 00:04                                                                                 

                                                                                             

22:12 Order name: Influenza Screen (a \T\ B); Complete Time: 00:04                              [facility]

                                                                                             

22:12 Order name: Lactate; Complete Time: 23:33                                               [facility] 

                                                                                             

22:12 Order name: Blood Culture Adult (2)                                                     [facility] 

                                                                                             

21:50 Order name: XRAY Chest (1 view); Complete Time: 23:33                                   [facility] 

                                                                                             

21:50 Order name: EKG; Complete Time: 21:51                                                   [facility] 

                                                                                             

21:50 Order name: Cardiac monitoring; Complete Time: 21:53                                    [facility] 

                                                                                             

21:50 Order name: EKG - Nurse/Tech; Complete Time: 22:35                                      [facility] 

                                                                                             

21:50 Order name: IV Saline Lock; Complete Time: 21:53                                        [facility] 

                                                                                             

21:50 Order name: Labs collected and sent; Complete Time: 21:53                               [facility] 

                                                                                             

21:50 Order name: O2 Per Protocol; Complete Time: 21:53                                       [facility] 

                                                                                             

21:50 Order name: O2 Sat Monitoring; Complete Time: 21:54                                     [facility] 

                                                                                             

21:50 Order name: Urine Dipstick-Ancillary (obtain specimen); Complete Time: 22:35            [facility] 

                                                                                             

21:50 Order name: CT Head Brain wo Cont; Complete Time: 22:08                                 [facility] 

                                                                                             

22:34 Order name: Urine Dipstick-Ancillary; Complete Time: 23:33                              [facility]

                                                                                             

00:43 Order name: Glucose, Ancillary Testing; Complete Time: 01:02                            [facility]

                                                                                                  

Administered Medications:                                                                         

No medications were administered                                                                  

                                                                                                  

                                                                                                  

Point of Care Testing:                                                                            

      Blood Glucose:                                                                              

                                                                                             

21:31 Blood Glucose: 254 mg/dL;                                                               lp1 

      Ranges:                                                                                     

      Critical Glucose Levels:Adult <50 mg/dl or >400 mg/dl  <40 mg/dl or >180 mg/dl       

Disposition Summary:                                                                              

22 02:17                                                                                    

Discharge Ordered                                                                                 

      Location: Home                                                                          [facility] 

      Problem: an acute exacerbation                                                          [facility] 

      Symptoms: have improved                                                                 [facility] 

      Condition: Stable                                                                       [facility] 

      Diagnosis                                                                                   

        - Confusion, resolved                                                                 [facility] 

        - Chronic combined systolic (congestive) and diastolic (congestive) heart failure     [facility] 

      Followup:                                                                               [facility] 

        - With: Private Physician                                                                  

        - When: 1 - 2 days                                                                         

        - Reason: Worsening of condition, Recheck today's complaints, Continuance of care,         

      Re-evaluation by your physician                                                             

      Discharge Instructions:                                                                     

        - Discharge Summary Sheet                                                             mh7 

        - Confusion                                                                           mh7 

        - Heart Failure, Self Care, Easy-to-Read                                              mh7 

        - Supporting Someone With Heart Failure                                               mh7 

        - Heart Failure Exacerbation                                                          [facility] 

      Forms:                                                                                      

        - Medication Reconciliation Form                                                      7 

        - Thank You Letter                                                                    mh7 

        - Antibiotic Education                                                                mh7 

        - Prescription Opioid Use                                                             mh7 

                                                                                                  

NIH Stroke Scale - NIH Stroke Score                                                               

Date: 2022                                                                                  

Time: 21:40                                                                                       

Total Score = 1                                                                                   

  1a. Level of Consciousness (LOC) - 0(Alert)                                                     

  1b. Level of Consciousness (LOC) (Month \T\ Age) - 0(Both)                                      

  1c. LOC Commands (Open \T\ Closes Eyes/) - 0(Both)                                          

   2. Best Gaze (Lateral Gaze Paresis) - 0(Normal)                                                

   3. Visual Field Loss - 0(No visual loss)                                                       

   4. Facial Palsy - 0(Normal)                                                                    

  5a. Left Arm: Motor (10-second hold) - 0(No drift)                                              

  5b. Right Arm: Motor (10-second hold) - 0(No drift)                                             

  6a. Left Leg: Motor (5-second hold - always test supine) - 0(No drift)                          

  6b. Right Leg: Motor (5-second hold - always test supine) - 0(No drift)                         

   7. Limb Ataxia (finger/nose \T\ heel/shin - test with eyes open) - 0(Absent)                   

   8. Sensory Loss (pinprick arms/legs/face) - 0(Normal)                                          

   9. Best Language: Aphasia (description/naming/reading) - 1(Mild to moderate                    

      aphasia)                                                                                    

  10. Dysarthria (speech clarity - read or repeat words) - 0(Normal)                              

  11. Extinction and Inattention (visual/tactile/auditory/spatial/personal) - 0(No                

      abnormality)                                                                                

Initials: lp1                                                                                     

                                                                                                  

Signatures:                                                                                       

Dispatcher [employer]                           Serena Shelton RN                         RN   [id number]                                                  

Azeem Manuel, JOHANAP-C                      [id number]                                                  

Hakan Mendoza MD MD   [facility]                                                  

                                                                                                  

**************************************************************************************************

## 2022-07-10 ENCOUNTER — HOSPITAL ENCOUNTER (EMERGENCY)
Dept: HOSPITAL 97 - ER | Age: 72
LOS: 1 days | Discharge: ANOTHER HOSPITAL | End: 2022-07-11
Payer: COMMERCIAL

## 2022-07-10 DIAGNOSIS — R53.1: ICD-10-CM

## 2022-07-10 DIAGNOSIS — R50.9: ICD-10-CM

## 2022-07-10 DIAGNOSIS — Z95.811: ICD-10-CM

## 2022-07-10 DIAGNOSIS — R41.82: Primary | ICD-10-CM

## 2022-07-10 DIAGNOSIS — I10: ICD-10-CM

## 2022-07-10 DIAGNOSIS — Z88.5: ICD-10-CM

## 2022-07-10 DIAGNOSIS — I50.40: ICD-10-CM

## 2022-07-10 DIAGNOSIS — Z20.822: ICD-10-CM

## 2022-07-10 LAB
ALBUMIN SERPL BCP-MCNC: 3.8 G/DL (ref 3.4–5)
ALP SERPL-CCNC: 120 U/L (ref 45–117)
ALT SERPL W P-5'-P-CCNC: 38 U/L (ref 12–78)
AST SERPL W P-5'-P-CCNC: 19 U/L (ref 15–37)
BUN BLD-MCNC: 27 MG/DL (ref 7–18)
GLUCOSE SERPLBLD-MCNC: 281 MG/DL (ref 74–106)
HCT VFR BLD CALC: 31.6 % (ref 39.6–49)
INR BLD: 1.6
LYMPHOCYTES # SPEC AUTO: 0.4 K/UL (ref 0.7–4.9)
MAGNESIUM SERPL-MCNC: 1.8 MG/DL (ref 1.8–2.4)
MCV RBC: 83.4 FL (ref 80–100)
NT-PROBNP SERPL-MCNC: 2344 PG/ML (ref ?–125)
PMV BLD: 6.9 FL (ref 7.6–11.3)
POTASSIUM SERPL-SCNC: 4.1 MMOL/L (ref 3.5–5.1)
RBC # BLD: 3.79 M/UL (ref 4.33–5.43)
TROPONIN I SERPL HS-MCNC: 24.2 PG/ML (ref ?–58.9)

## 2022-07-10 PROCEDURE — 84484 ASSAY OF TROPONIN QUANT: CPT

## 2022-07-10 PROCEDURE — 87804 INFLUENZA ASSAY W/OPTIC: CPT

## 2022-07-10 PROCEDURE — 76377 3D RENDER W/INTRP POSTPROCES: CPT

## 2022-07-10 PROCEDURE — 87040 BLOOD CULTURE FOR BACTERIA: CPT

## 2022-07-10 PROCEDURE — 71045 X-RAY EXAM CHEST 1 VIEW: CPT

## 2022-07-10 PROCEDURE — 85730 THROMBOPLASTIN TIME PARTIAL: CPT

## 2022-07-10 PROCEDURE — 85610 PROTHROMBIN TIME: CPT

## 2022-07-10 PROCEDURE — 83605 ASSAY OF LACTIC ACID: CPT

## 2022-07-10 PROCEDURE — 87088 URINE BACTERIA CULTURE: CPT

## 2022-07-10 PROCEDURE — 83735 ASSAY OF MAGNESIUM: CPT

## 2022-07-10 PROCEDURE — 82248 BILIRUBIN DIRECT: CPT

## 2022-07-10 PROCEDURE — 81015 MICROSCOPIC EXAM OF URINE: CPT

## 2022-07-10 PROCEDURE — 85025 COMPLETE CBC W/AUTO DIFF WBC: CPT

## 2022-07-10 PROCEDURE — 81003 URINALYSIS AUTO W/O SCOPE: CPT

## 2022-07-10 PROCEDURE — 80053 COMPREHEN METABOLIC PANEL: CPT

## 2022-07-10 PROCEDURE — 82947 ASSAY GLUCOSE BLOOD QUANT: CPT

## 2022-07-10 PROCEDURE — 36415 COLL VENOUS BLD VENIPUNCTURE: CPT

## 2022-07-10 PROCEDURE — 83880 ASSAY OF NATRIURETIC PEPTIDE: CPT

## 2022-07-10 PROCEDURE — 87086 URINE CULTURE/COLONY COUNT: CPT

## 2022-07-10 PROCEDURE — 74176 CT ABD & PELVIS W/O CONTRAST: CPT

## 2022-07-11 VITALS — TEMPERATURE: 99.4 F

## 2022-07-11 VITALS — SYSTOLIC BLOOD PRESSURE: 146 MMHG | OXYGEN SATURATION: 96 % | DIASTOLIC BLOOD PRESSURE: 75 MMHG

## 2022-07-11 NOTE — EDPHYS
Physician Documentation                                                                           

 [facility]                                                                 

Name: Tu Marie                                                                                 

Age: 71 yrs                                                                                       

Sex: Male                                                                                         

: 1950                                                                                   

MRN: [id number]                                                                                   

Arrival Date: 07/10/2022                                                                          

Time: 22:16                                                                                       

Account#: [id number]                                                                            

Bed 18                                                                                            

Private MD:                                                                                       

ED Physician Yaniv Guillermo                                                                      

HPI:                                                                                              

                                                                                             

02:15 This 71 yrs old  Male presents to ER via Wheelchair with complaints of Altered stefanie 

      Mental Status.                                                                              

                                                                                                  

Historical:                                                                                       

- Allergies:                                                                                      

07/10                                                                                             

22:50 Codeine;                                                                                lg3 

22:50 Fentanyl;                                                                               lg3 

22:50 Xanax;                                                                                  lg3 

- PMHx:                                                                                           

22:50 CAD; CHF; Diabetes - IDDM; Hyperlipidemia; Hypertension;                                lg3 

- PSHx:                                                                                           

22:50 LVAD;                                                                                   lg3 

                                                                                                  

- Immunization history:: Adult Immunizations up to date, Client reports receiving the             

  2nd dose of the Covid vaccine.                                                                  

- Social history:: Smoking status: Patient denies any tobacco usage or history of.                

  Patient/guardian denies using alcohol.                                                          

                                                                                                  

                                                                                                  

ROS:                                                                                              

                                                                                             

02:21 Eyes: Negative for injury, pain, redness, and discharge, ENT: Negative for injury,      stefanie 

      pain, and discharge, Neck: Negative for injury, pain, and swelling, Cardiovascular:         

      Negative for chest pain, palpitations, and edema, Respiratory: Negative for shortness       

      of breath, cough, wheezing, and pleuritic chest pain, Abdomen/GI: Negative for              

      abdominal pain, nausea, vomiting, diarrhea, and constipation, Back: Negative for injury     

      and pain, : Negative for injury, bleeding, discharge, and swelling, MS/Extremity:         

      Negative for injury and deformity, Skin: Negative for injury, rash, and discoloration,      

      Psych: Negative for depression, anxiety, suicide ideation, homicidal ideation, and          

      hallucinations, Allergy/Immunology: Negative for hives, rash, and allergies, Endocrine:     

      Negative for neck swelling, polydipsia, polyuria, polyphagia, and marked weight             

      changes, Hematologic/Lymphatic: Negative for swollen nodes, abnormal bleeding, and          

      unusual bruising.                                                                           

      Constitutional: Positive for fatigue, fever.                                                

      Cardiovascular:                                                                             

      Neuro: Positive for weakness.                                                               

                                                                                                  

Exam:                                                                                             

02:21 Head/Face:  Normocephalic, atraumatic. Eyes:  Pupils equal round and reactive to light, stefanie 

      extra-ocular motions intact.  Lids and lashes normal.  Conjunctiva and sclera are           

      non-icteric and not injected.  Cornea within normal limits.  Periorbital areas with no      

      swelling, redness, or edema. ENT:  Nares patent. No nasal discharge, no septal              

      abnormalities noted.  Tympanic membranes are normal and external auditory canals are        

      clear.  Oropharynx with no redness, swelling, or masses, exudates, or evidence of           

      obstruction, uvula midline.  Mucous membranes moist. Neck:  Trachea midline, no             

      thyromegaly or masses palpated, and no cervical lymphadenopathy.  Supple, full range of     

      motion without nuchal rigidity, or vertebral point tenderness.  No Meningismus.             

      Chest/axilla:  Normal chest wall appearance and motion.  Nontender with no deformity.       

      No lesions are appreciated. Cardiovascular:  Regular rate and rhythm with a normal S1       

      and S2.  No gallops, murmurs, or rubs.  Normal PMI, no JVD.  No pulse deficits.             

      Respiratory:  Lungs have equal breath sounds bilaterally, clear to auscultation and         

      percussion.  No rales, rhonchi or wheezes noted.  No increased work of breathing, no        

      retractions or nasal flaring. Abdomen/GI:  Soft, non-tender, with normal bowel sounds.      

      No distension or tympany.  No guarding or rebound.  No evidence of tenderness               

      throughout. Back:  No spinal tenderness.  No costovertebral tenderness.  Full range of      

      motion. Male :  Normal genitalia with no discharge or lesions. Skin:  Warm, dry with      

      normal turgor.  Normal color with no rashes, no lesions, and no evidence of cellulitis.     

      MS/ Extremity:  Pulses equal, no cyanosis.  Neurovascular intact.  Full, normal range       

      of motion. Psych:  Awake, alert, with orientation to person, place and time.  Behavior,     

      mood, and affect are within normal limits.                                                  

02:21 Constitutional: The patient appears febrile, uncomfortable.                                 

                                                                                                  

Vital Signs:                                                                                      

07/10                                                                                             

22:47  / 75; Pulse 75; Resp 32 S; Temp 102.4(O); Pulse Ox 97% on R/A; Weight 68.04 kg   lg3 

      (R); Height 5 ft. 8 in. (172.72 cm) (R);                                                    

                                                                                             

00:10  / 75; Pulse 96; Resp 20 S; Pulse Ox 96% on R/A;                                  lg3 

01:32  / 82; Pulse 86; Resp 20 S; Pulse Ox 95% on R/A;                                  lg3 

02:02 Temp 99.4;                                                                              la1 

06:13  / 75; Pulse 62; Resp 18 S; Pulse Ox 96% on R/A;                                  [facility] 

07/10                                                                                             

22:47 Body Mass Index 22.81 (68.04 kg, 172.72 cm)                                             3 

                                                                                                  

Octavio Coma Score:                                                                               

02:23 Eye Response: spontaneous(4). Verbal Response: oriented(5). Motor Response: obeys       [facility] 

      commands(6). Total: 15.                                                                     

                                                                                                  

MDM:                                                                                              

07/10                                                                                             

22:29 Patient medically screened.                                                               

23:23 Patient medically screened.                                                             [facility] 

                                                                                             

02:23 Differential diagnosis: viral Infection, bacterial infection, URI, bronchitis,          stefanie 

      pneumonia UTI, gastroenteritis. Differential Diagnosis altered mental status, sepsis,       

      flu. Differential Diagnosis: CVA, electrolyte abnormality, hypoglycemia, seizure,           

      sepsis, TIA, UTI, volume depletion. Data reviewed: vital signs, nurses notes, lab test      

      result(s), EKG, radiologic studies, plain films. Data interpreted: Cardiac monitor:         

      rate is 86 beats/min, rhythm is regular, Pulse oximetry: on room air is 95 %. Test          

      interpretation: by ED physician or midlevel provider: ECG, plain radiologic studies.        

      Counseling: I had a detailed discussion with the patient and/or guardian regarding: the     

      historical points, exam findings, and any diagnostic results supporting the                 

      discharge/admit diagnosis, lab results, radiology results, the need for outpatient          

      follow up.                                                                                  

                                                                                                  

07/10                                                                                             

22:57 Order name: Blood Culture Adult (2)                                                     [facility] 

07/10                                                                                             

22:57 Order name: CBC with Diff; Complete Time: 00:27                                         [facility] 

07/10                                                                                             

22:57 Order name: CMP; Complete Time: 00:27                                                   [facility] 

07/10                                                                                             

22:57 Order name: Lactate; Complete Time: 00:27                                               [facility] 

07/10                                                                                             

22:57 Order name: Protime (+inr); Complete Time: 00:27                                        [facility] 

07/10                                                                                             

22:57 Order name: Ptt, Activated; Complete Time: 00:27                                        [facility] 

07/10                                                                                             

23:11 Order name: Glucose, Ancillary Testing; Complete Time: 00:27                            [facility]

07/10                                                                                             

23:16 Order name: Flu; Complete Time: 00:55                                                   [facility] 

07/10                                                                                             

22:57 Order name: Accucheck; Complete Time: 22:59                                             [facility] 

07/10                                                                                             

23:16 Order name: XRAY Chest (1 view)                                                         [facility] 

07/10                                                                                             

23:16 Order name: Urine Culture                                                               [facility] 

07/10                                                                                             

23:16 Order name: CT Stone Protocol                                                           [facility] 

07/10                                                                                             

23:18 Order name: Influenza Screen (A ; Complete Time: 00:27                                  [facility]

07/10                                                                                             

23:25 Order name: Bilirubin Direct; Complete Time: 00:27                                      [facility]

07/10                                                                                             

23:25 Order name: Troponin High Sensitivity; Complete Time: 00:27                             [facility]

07/10                                                                                             

23:25 Order name: NT PRO-BNP; Complete Time: 00:27                                            [facility]

07/10                                                                                             

23:25 Order name: Magnesium; Complete Time: 00:27                                             [facility]

                                                                                             

00:09 Order name: Urine Dipstick-Ancillary; Complete Time: 00:27                              [facility]

                                                                                             

02:06 Order name: Urine Microscopic Only; Complete Time: 02:19                                [facility] 

07/10                                                                                             

22:57 Order name: Cardiac monitoring; Complete Time: 22:59                                    [facility] 

07/10                                                                                             

22:57 Order name: EKG - Nurse/Tech; Complete Time: 00:16                                      [facility] 

07/10                                                                                             

22:57 Order name: IV Saline Lock - Large Bore; Complete Time: 22:59                           [facility] 

07/10                                                                                             

22:57 Order name: Labs collected and sent; Complete Time: 23:05                               [facility] 

07/10                                                                                             

22:57 Order name: O2 Per Protocol; Complete Time: 23:05                                       [facility] 

07/10                                                                                             

22:57 Order name: O2 Sat Monitoring; Complete Time: 23:05                                     [facility] 

07/10                                                                                             

23:16 Order name: IV Saline Lock; Complete Time: 23:22                                        [facility] 

07/10                                                                                             

23:16 Order name: Urine Dipstick-Ancillary (obtain specimen); Complete Time: 00:09            [facility] 

                                                                                                  

Administered Medications:                                                                         

07/10                                                                                             

23:52 Drug: Rocephin (cefTRIAXone) 1 grams Route: IV; Rate: per protocol; Site: right forearm;[facility] 

                                                                                             

06:14 Follow up: Response: No adverse reaction; IV Status: Completed infusion; IV Intake: [id number] 

07/10                                                                                             

23:53 Drug: Tylenol 650 mg Route: PO;                                                         [facility] 

                                                                                             

00:16 Follow up: Response: No adverse reaction                                                [facility] 

07/10                                                                                             

23:53 Drug: NS 0.9% 1000 ml Route: IV; Rate: 1 bolus; Site: right forearm;                    [facility] 

                                                                                             

06:14 Follow up: IV Status: Completed infusion; IV Intake: 1000ml                             lg3 

02:38 Drug: vancoMYCIN 1 grams Route: IVPB; Infused Over: 2 hrs; Site: right forearm;         lg3 

05:11 Follow up: Response: No adverse reaction; IV Status: Completed infusion; IV Intake:     lg3 

      250ml                                                                                       

                                                                                                  

                                                                                                  

Disposition Summary:                                                                              

22 02:27                                                                                    

Transfer Ordered                                                                                  

      Reason: Higher level of care                                                            stefanie 

      Condition: Stable                                                                       stefanie 

      Problem: new                                                                            stefanie 

      Symptoms: have improved                                                                 stefanie 

      Transfer Location: [facility](22 03:36)                                     stefanie 

      Accepting Physician: to [facility](22 07:10)                                    ph  

      Diagnosis                                                                                   

        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 

      - LVAD Patient                                                                              

        - Fever, unspecified                                                                  stefanie 

        - Altered mental status, unspecified                                                  stefanie 

        - Weakness                                                                            stefanie 

      Forms:                                                                                      

        - Medication Reconciliation Form                                                      stefanie 

        - SBAR form                                                                           stefanie 

Signatures:                                                                                       

Dispatcher [employer]                           EDMS                                                 

Marcela Brooks, FNP-C                 FNP-Ckb                                                   

Yaniv Guillermo MD MD cha Attema, Lee FNP-C                      FNP-ClaShama Castillo RN                      RN                                                      

Hailey Britton RN                       RN   lg3                                                  

                                                                                                  

Corrections: (The following items were deleted from the chart)                                    

07/10                                                                                             

23:24 23:17 BASIC METABOLIC PANEL+C.LAB.BRZ ordered. EDMS                                     EDMS

23:24 23:17 HEPATIC FUNCTION+C.LAB.BRZ ordered. EDMS                                          EDMS

23:24 23:17 MAGNESIUM+C.LAB.BRZ ordered. EDMS                                                 EDMS

23:24 23:17 PROBNP+C.LAB.BRZ ordered. EDMS                                                    EDMS

23:24 23:17 Troponin High Sensitivity+C.LAB.BRZ ordered. EDMS                                 EDMS

                                                                                             

02:28 02:27 to [facility]                                                                    stefanie 

03:36 02:27 [facility]                                                       stefanie 

03:36 02:28 to [facility]                                                                    stefanie 

07:10 03:36 to [facility]                                                               ph  

                                                                                                  

**************************************************************************************************

## 2022-07-11 NOTE — RAD REPORT
EXAM DESCRIPTION:  Chest Single View 7/11/2022 12:41 AM CDT

 

CLINICAL HISTORY:  71 years, Male, COUGH

 

COMPARISON:  None

 

FINDINGS:  Single view of the chest was obtained portable. No prior films are available for compariso
n.   The lung volume is slightly decreased. Elevation of the right hemidiaphragm. There is a dual-maurice
d pacemaker via left subclavian. Sternotomy wires and metallic ring along the aortic valve correspond
ing to previous valvular replacement. There is a radiodense most likely external device lower chest/u
pper abdomen. The heart is prominent. The thoracic aorta is mildly tortuous with intimal calcificatio
n. Pulmonary vascular structures normal distribution. No significant pleural effusions. The rest of t
he soft tissue and bony structures demonstrate to be unremarkable.

 

IMPRESSION:  Slight decreased lung volume.

Elevation of the right hemidiaphragm.

Status post aortic valve replacement.

Mild cardiomegaly.

Pacemaker in place.

 

Electronically signed by:   Sammy Chisholm MD   7/11/2022 12:43 AM CDT Workstation: [id number]

 

 

Due to temporary technical issues with the PACS/Fluency reporting system, reports are being signed by
 the in house radiologists without review as a courtesy to insure prompt reporting. The interpreting 
radiologist is fully responsible for the content of the report.

## 2022-07-11 NOTE — ER
Nurse's Notes                                                                                     

 [facility]                                                                 

Name: Tu Marie                                                                                 

Age: 71 yrs                                                                                       

Sex: Male                                                                                         

: 1950                                                                                   

MRN: [id number]                                                                                   

Arrival Date: 07/10/2022                                                                          

Time: 22:16                                                                                       

Account#: [id number]                                                                            

Bed 18                                                                                            

Private MD:                                                                                       

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            

  failure-LVAD Patient;Fever, unspecified;Altered mental status, unspecified;Weakness             

                                                                                                  

Presentation:                                                                                     

07/10                                                                                             

22:47 Chief complaint: Patient states: weakness and dizziness starting this morning.          lg3 

      Coronavirus screen: Client denies travel out of the [place] in the last 14 days. At this       

      time, the client does not indicate any symptoms associated with coronavirus-19. Ebola       

      Screen: No symptoms or risks identified at this time. Initial Sepsis Screen: Does the       

      patient meet any 2 criteria? RR > 20 per min. Temp <36.0*C (96.8*F)) or > 38.3*C            

      (100.9*F). Altered Mental Status. Yes Does the patient have a suspected source of           

      infection? Yes: Catheter related infection (Martinez/dialysis/PICC/central line) Risk          

      Assessment: Do you want to hurt yourself or someone else? Patient reports no desire to      

      harm self or others. Onset of symptoms was July 10, 2022.                                   

22:47 Method Of Arrival: Wheelchair                                                           lg3 

22:47 Acuity: GERMAN 3                                                                           lg3 

                                                                                                  

Triage Assessment:                                                                                

22:50 General: Appears in no apparent distress. comfortable, Behavior is calm, cooperative.   lg3 

      Pain: Complains of pain in generalized. EENT: No deficits noted. No signs and/or            

      symptoms were reported regarding the EENT system. Neuro: Level of Consciousness is          

      awake, obeys commands, confused, Oriented to person, place, time, situation, Speech is      

      normal, Reports dizziness, weakness. Cardiovascular: Denies chest pain, shortness of        

      breath, Capillary refill < 3 seconds Clubbing of nail beds is absent JVD is absent          

      Patient's skin is warm and dry. LVAD in place. Respiratory: No deficits noted. Airway       

      is patent Trachea midline Respiratory effort is even, unlabored, Respiratory pattern is     

      symmetrical, tachypnea Breath sounds are clear bilaterally. GI: Abdomen is flat,            

      non-distended, Bowel sounds present X 4 quads. : Martinez in place. Derm: Skin is            

      intact, is thin, Skin is dry, Skin temperature is warm. Musculoskeletal: Reports            

      generalized weakness since this morning.                                                    

                                                                                                  

Historical:                                                                                       

- Allergies:                                                                                      

22:50 Codeine;                                                                                lg3 

22:50 Fentanyl;                                                                               lg3 

22:50 Xanax;                                                                                  lg3 

- PMHx:                                                                                           

22:50 CAD; CHF; Diabetes - IDDM; Hyperlipidemia; Hypertension;                                lg3 

- PSHx:                                                                                           

22:50 LVAD;                                                                                   lg3 

                                                                                                  

- Immunization history:: Adult Immunizations up to date, Client reports receiving the             

  2nd dose of the Covid vaccine.                                                                  

- Social history:: Smoking status: Patient denies any tobacco usage or history of.                

  Patient/guardian denies using alcohol.                                                          

                                                                                                  

                                                                                                  

Screenin:55 Abuse screen: Denies threats or abuse. Denies injuries from another. Nutritional        lg3 

      screening: No deficits noted. Tuberculosis screening: No symptoms or risk factors           

      identified. Fall Risk IV access (20 points). Gait- Impaired (20 pts.). Mental Status-       

      Overestimates/Forgets Limitations (15 pts.). Total Pichardo Fall Scale indicates High Risk     

      Score (45 or more points). Side Rails Up X 2 Frequent Obs/Assessments Occuring As           

      available patient and family educated on Fall Prevention Program and Strategies.            

                                                                                                  

Assessment:                                                                                       

22:55 General: see triage assessment .                                                        lg3 

                                                                                             

00:09 Reassessment: Patient appears in no apparent distress at this time. No changes from     lg3 

      previously documented assessment. Patient and/or family updated on plan of care and         

      expected duration. Pain level reassessed. Patient is alert, oriented x 3, equal             

      unlabored respirations, skin warm/dry/pink.                                                 

01:31 Reassessment: Patient appears in no apparent distress at this time. No changes from     lg3 

      previously documented assessment. Patient and/or family updated on plan of care and         

      expected duration. Pain level reassessed. Patient is alert, oriented x 3, equal             

      unlabored respirations, skin warm/dry/pink.                                                 

04:18 General: report called to BRANT Kong \T\ [facility].                                     lg3 

06:13 Reassessment: Patient appears in no apparent distress at this time. No changes from     lg3 

      previously documented assessment. Patient and/or family updated on plan of care and         

      expected duration. Pain level reassessed. Patient is alert, oriented x 3, equal             

      unlabored respirations, skin warm/dry/pink. pt quietly resting at this time.                

                                                                                                  

Vital Signs:                                                                                      

07/10                                                                                             

22:47  / 75; Pulse 75; Resp 32 S; Temp 102.4(O); Pulse Ox 97% on R/A; Weight 68.04 kg   lg3 

      (R); Height 5 ft. 8 in. (172.72 cm) (R);                                                    

                                                                                             

00:10  / 75; Pulse 96; Resp 20 S; Pulse Ox 96% on R/A;                                  lg3 

01:32  / 82; Pulse 86; Resp 20 S; Pulse Ox 95% on R/A;                                  lg3 

02:02 Temp 99.4;                                                                              la1 

06:13  / 75; Pulse 62; Resp 18 S; Pulse Ox 96% on R/A;                                  lg3 

07/10                                                                                             

22:47 Body Mass Index 22.81 (68.04 kg, 172.72 cm)                                             lg3 

                                                                                                  

Octavio Coma Score:                                                                               

02:23 Eye Response: spontaneous(4). Verbal Response: oriented(5). Motor Response: obeys       stefanie 

      commands(6). Total: 15.                                                                     

                                                                                                  

ED Course:                                                                                        

07/10                                                                                             

22:16 Patient arrived in ED.                                                                  bp1 

22:27 Hailey Britton, RN is Primary Nurse.                                                     lg3 

22:29 Marcela Brooks FNP-C is PHCP.                                                        kb  

22:29 Yaniv Guillermo MD is Attending Physician.                                             kb  

22:50 Triage completed.                                                                       lg3 

22:50 Arm band placed on right wrist.                                                         lg3 

22:55 Patient has correct armband on for positive identification. Placed in gown. Bed in low  lg3 

      position. Call light in reach. Side rails up X2. Client placed on continuous cardiac        

      and pulse oximetry monitoring. NIBP monitoring applied. Cardiac monitor on. Door            

      closed. Noise minimized.                                                                    

23:05 Blood Culture Adult (2) Sent.                                                           lg3 

23:05 CBC with Diff Sent.                                                                     lg3 

23:05 CMP Sent.                                                                               lg3 

23:05 Lactate Sent.                                                                           lg3 

23:05 Protime (+inr) Sent.                                                                    lg3 

23:05 Ptt, Activated Sent.                                                                    lg3 

23:10 Inserted saline lock: 18 gauge in right forearm, using aseptic technique. Blood         lg3 

      collected.                                                                                  

23:14 Yaniv Guillermo MD is Attending Physician.                                             stefanie 

23:22 Influenza Screen (A Sent.                                                               lg3 

23:30 XRAY Chest (1 view) In Process Unspecified.                                             EDMS

23:30 Bilirubin Direct Sent.                                                                  lg3 

23:30 Troponin High Sensitivity Sent.                                                         lg3 

23:30 NT PRO-BNP Sent.                                                                        lg3 

23:30 Magnesium Sent.                                                                         lg3 

                                                                                             

00:09 Family accompanied patient.                                                             lg3 

00:09 Urine Culture Sent.                                                                     lg3 

00:43 initiated a transfer with Iliana from [facility].                     mw2 

00:54 [facility] denied due to capacity.                                                       mw2 

00:55 paged the LVAD team with [facility] waiting for a call back.                             mw2 

01:01 Connected [address] Guillermo with Dr. Kaur from the [facility] LVAD Team.                     mw2 

01:17 CT Stone Protocol In Process Unspecified.                                               EDMS

01:54 Re initiated a transfer with Iliana from [facility]. She stated "we   [facility] 

      don't have any beds." I asked her if we could get hold of [facility] she said "         

      I'll call you back." Waiting for a call back.                                               

01:57 initiated a transfer with Tiana from [facility].                        mw2 

02:09 [facility] denied due to capacity.                                                       mw2 

02:12 initiated a transfer with Silvia from [facility].                 mw2 

02:56 connected Dr. Guillermo with the Doctor from [facility].                       mw2 

03:13 administrative approval given by Silvia Simpson/ patient has been accepted to [address] to a Heart IMU bed/ Dr. Moon accepted the patient in transfer/report to     

      be called to [phone].                                                                  

03:29 administrative approval given by Iliana Riojas/ patient has been accepted to          [address] ER/ Dr. Bustamante accepted the patient in transfer/report to be called to               

      [phone].                                                                               

03:30 [facility] transfer canceled due to [facility] accepting.                          mw2 

04:30 [facility] ETA 1 hour 30 minutes.                                                   mw2 

06:14 No provider procedures requiring assistance completed. Patient transferred, IV remains  lg3 

      in place. intact, No redness/swelling at site.                                              

06:36 [facility] ETA past 0700.                                                           mw2 

06:37 [facility] ETA 30 minutes.                                                        mw2 

                                                                                                  

Administered Medications:                                                                         

07/10                                                                                             

23:52 Drug: Rocephin (cefTRIAXone) 1 grams Route: IV; Rate: per protocol; Site: right forearm;lg3 

07/11                                                                                             

06:14 Follow up: Response: No adverse reaction; IV Status: Completed infusion; IV Intake: 60uzvt3 

07/10                                                                                             

23:53 Drug: Tylenol 650 mg Route: PO;                                                         lg3 

                                                                                             

00:16 Follow up: Response: No adverse reaction                                                lg3 

07/10                                                                                             

23:53 Drug: NS 0.9% 1000 ml Route: IV; Rate: 1 bolus; Site: right forearm;                    lg3 

                                                                                             

06:14 Follow up: IV Status: Completed infusion; IV Intake: 1000ml                             lg3 

02:38 Drug: vancoMYCIN 1 grams Route: IVPB; Infused Over: 2 hrs; Site: right forearm;         lg3 

05:11 Follow up: Response: No adverse reaction; IV Status: Completed infusion; IV Intake:     lg3 

      250ml                                                                                       

                                                                                                  

                                                                                                  

Medication:                                                                                       

06:15 VIS not applicable for this client.                                                     lg3 

                                                                                                  

Intake:                                                                                           

05:11 IV: 250ml; Total: 250ml.                                                                lg3 

06:14 IV: 1000ml; Total: 1250ml.                                                              lg3 

06:14 IV: 10ml; Total: 1260ml.                                                                lg3 

                                                                                                  

Outcome:                                                                                          

02:27 ER care complete, transfer ordered by MD. watts 

06:15 Transferred by ground EMS to [facility], Transfer form completed.           lg3 

06:15 Condition: stable                                                                           

06:15 Instructed on the need for transfer, Demonstrated understanding of instructions.            

07:10 Patient left the ED.                                                                    ph  

                                                                                                  

Signatures:                                                                                       

Dispatcher [employer]                           EDMarcela Fermin, FNP-C                 FNP-CkYaniv Estevez MD MD cha Hall, Patricia RN                      RN                                                      

Sharona Crowder                            2                                                  

Hailey Britton RN RN   [facility]                                                  

Johanna Darnell                                                  

                                                                                                  

Corrections: (The following items were deleted from the chart)                                    

02:48 01:54 Re initiated a transfer with Iliana from [facility] mw2         mw2 

                                                                                                  

**************************************************************************************************

## 2022-08-12 ENCOUNTER — HOSPITAL ENCOUNTER (EMERGENCY)
Dept: HOSPITAL 97 - ER | Age: 72
Discharge: HOME | End: 2022-08-12
Payer: COMMERCIAL

## 2022-08-12 VITALS — DIASTOLIC BLOOD PRESSURE: 98 MMHG | OXYGEN SATURATION: 98 % | SYSTOLIC BLOOD PRESSURE: 131 MMHG

## 2022-08-12 VITALS — TEMPERATURE: 98 F

## 2022-08-12 DIAGNOSIS — R31.9: Primary | ICD-10-CM

## 2022-08-12 DIAGNOSIS — I10: ICD-10-CM

## 2022-08-12 DIAGNOSIS — Z88.5: ICD-10-CM

## 2022-08-12 PROCEDURE — 99284 EMERGENCY DEPT VISIT MOD MDM: CPT

## 2022-08-12 PROCEDURE — 81015 MICROSCOPIC EXAM OF URINE: CPT

## 2022-08-12 PROCEDURE — 51702 INSERT TEMP BLADDER CATH: CPT

## 2022-08-12 PROCEDURE — 81003 URINALYSIS AUTO W/O SCOPE: CPT

## 2022-08-12 NOTE — EDPHYS
Physician Documentation                                                                           

 [facility]                                                                 

Name: Tu Marie                                                                                 

Age: 72 yrs                                                                                       

Sex: Male                                                                                         

: 1950                                                                                   

MRN: [id number]                                                                                   

Arrival Date: 2022                                                                          

Time: 13:29                                                                                       

Account#: [id number]                                                                            

Bed 19                                                                                            

Private MD:                                                                                       

ED Physician Navdeep Patel                                                                        

HPI:                                                                                              

                                                                                             

14:06 This 72 yrs old Male presents to ER via Ambulatory with complaints of Problem With      jr11

      Urinary Catheter.                                                                           

14:06 The patient presents with urinary symptoms, HEMATURIA X few hrs after accidentally      jr11

      pulling on it . Onset: The symptoms/episode began/occurred PTA. Modifying factors: The      

      symptoms are alleviated by nothing, the symptoms are aggravated by nothing. Associated      

      signs and symptoms: The patient has no apparent associated signs or symptoms. Severity      

      of symptoms: At their worst the symptoms were moderate, in the emergency department the     

      symptoms are unchanged. Denies any other concerns, LVAD no issues .                         

                                                                                                  

Historical:                                                                                       

- Allergies:                                                                                      

14:05 Codeine;                                                                                ss  

14:05 Fentanyl;                                                                               ss  

14:05 Xanax;                                                                                  ss  

- PMHx:                                                                                           

14:05 CAD; CHF; Diabetes - IDDM; Hyperlipidemia; Hypertension;                                ss  

- PSHx:                                                                                           

14:05 LVAD;                                                                                   ss  

                                                                                                  

- Immunization history:: Client reports receiving the 2nd dose of the Covid vaccine.              

- Social history:: Smoking status: Patient denies any tobacco usage or history of.                

                                                                                                  

                                                                                                  

ROS:                                                                                              

14:06 All other systems are negative.                                                         jr11

                                                                                                  

Exam:                                                                                             

14:06 Constitutional:  This is a well developed, well nourished patient who is awake, alert,  jr11

      and in no acute distress. Head/Face:  Normocephalic, atraumatic. Eyes:  Extra-ocular        

      motions intact.  Lids and lashes normal.  Conjunctiva and sclera are non-icteric and        

      not injected.  Cornea within normal limits.  Periorbital areas with no swelling,            

      redness, or edema. Neck:  Trachea midline, no thyromegaly or masses palpated, and no        

      cervical lymphadenopathy.  Supple, full range of motion without nuchal rigidity, or         

      vertebral point tenderness.  No Meningismus. Chest/axilla:  Normal chest wall               

      appearance and motion.  Nontender with no deformity.  No lesions are appreciated.           

      Cardiovascular:  Regular rate and rhythm with a normal S1 and S2.  No gallops, murmurs,     

      or rubs.  Normal PMI, no JVD.  No pulse deficits. Respiratory:  Lungs have equal breath     

      sounds bilaterally, clear to auscultation and percussion.  No rales, rhonchi or wheezes     

      noted.  No increased work of breathing, no retractions or nasal flaring. Abdomen/GI:        

      LVAD c/d/i  Male :  FTG in place with gross hematuria  MS/ Extremity:  Pulses equal,      

      no cyanosis.  Neurovascular intact.  Full, normal range of motion.                          

                                                                                                  

Vital Signs:                                                                                      

14:01  / 86; Pulse 62; Resp 16; Temp 98.0(TE); Pulse Ox 100% on R/A; Weight 72.12 kg;   ss  

      Height 5 ft. 7 in. (170.18 cm); Pain 8/10;                                                  

14:30  / 98; Pulse 62; Resp 20 S; Pulse Ox 98% on R/A;                                  jg9 

14:01 Body Mass Index 24.90 (72.12 kg, 170.18 cm)                                             ss  

                                                                                                  

MDM:                                                                                              

14:03 Patient medically screened.                                                             jr11

14:06 Differential diagnosis: hematuria. Data reviewed: vital signs, nurses notes.            jr11

15:15 Other consultation: Wife spoke to urologist rec changing catheter, no concern for       jr11

      infection. Wife knows how to flush catheter and will return if occluded. .                  

                                                                                                  

08                                                                                             

13:56 Order name: UA MICROSCOPIC                                                              jr11

                                                                                             

15:03 Order name: Urine Dipstick-Ancillary; Complete Time: 15:12                              EDMS

                                                                                                  

Administered Medications:                                                                         

No medications were administered                                                                  

                                                                                                  

                                                                                                  

Disposition Summary:                                                                              

22 15:16                                                                                    

Discharge Ordered                                                                                 

      Location: Home                                                                          jr11

      Condition: Fair                                                                         jr11

      Diagnosis                                                                                   

        - Hematuria, unspecified                                                              jr11

      Discharge Instructions:                                                                     

        - Discharge Summary Sheet                                                             jr11

        - Hematuria, Adult                                                                    jr11

      Forms:                                                                                      

        - Medication Reconciliation Form                                                      jr11

        - Thank You Letter                                                                    jr11

        - Antibiotic Education                                                                jr11

        - Prescription Opioid Use                                                             jr11

Signatures:                                                                                       

Dispatcher [employer]                           EDMS                                                 

Janna Noble RN RN                                                      

Navdeep Patel MD MD   jr11                                                 

                                                                                                  

Corrections: (The following items were deleted from the chart)                                    

14:03 13:56 IV Saline Lock ordered. jr11                                                      jr11

14:03 13:56 Labs collected and sent ordered. jr11                                             jr11

                                                                                                  

**************************************************************************************************

## 2022-08-16 ENCOUNTER — HOSPITAL ENCOUNTER (EMERGENCY)
Dept: HOSPITAL 97 - ER | Age: 72
LOS: 1 days | Discharge: HOME | End: 2022-08-17
Payer: COMMERCIAL
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
## 2022-08-16 DIAGNOSIS — Z88.5: ICD-10-CM

## 2022-08-16 DIAGNOSIS — E11.65: ICD-10-CM

## 2022-08-16 DIAGNOSIS — I10: ICD-10-CM

## 2022-08-16 DIAGNOSIS — Z79.4: ICD-10-CM

## 2022-08-16 DIAGNOSIS — S00.83XA: Primary | ICD-10-CM

## 2022-08-16 DIAGNOSIS — S50.312A: ICD-10-CM

## 2022-08-16 DIAGNOSIS — I50.9: ICD-10-CM

## 2022-08-16 LAB
BUN BLD-MCNC: 29 MG/DL (ref 7–18)
GLUCOSE SERPLBLD-MCNC: 305 MG/DL (ref 74–106)
HCT VFR BLD CALC: 30.6 % (ref 39.6–49)
INR BLD: 1.81
LYMPHOCYTES # SPEC AUTO: 0.5 K/UL (ref 0.7–4.9)
MCV RBC: 82.8 FL (ref 80–100)
PMV BLD: 7.4 FL (ref 7.6–11.3)
POTASSIUM SERPL-SCNC: 4.5 MMOL/L (ref 3.5–5.1)
RBC # BLD: 3.7 M/UL (ref 4.33–5.43)

## 2022-08-16 PROCEDURE — 72125 CT NECK SPINE W/O DYE: CPT

## 2022-08-16 PROCEDURE — 81003 URINALYSIS AUTO W/O SCOPE: CPT

## 2022-08-16 PROCEDURE — 70450 CT HEAD/BRAIN W/O DYE: CPT

## 2022-08-16 PROCEDURE — 99284 EMERGENCY DEPT VISIT MOD MDM: CPT

## 2022-08-16 PROCEDURE — 71045 X-RAY EXAM CHEST 1 VIEW: CPT

## 2022-08-16 PROCEDURE — 85025 COMPLETE CBC W/AUTO DIFF WBC: CPT

## 2022-08-16 PROCEDURE — 80048 BASIC METABOLIC PNL TOTAL CA: CPT

## 2022-08-16 PROCEDURE — 36415 COLL VENOUS BLD VENIPUNCTURE: CPT

## 2022-08-16 PROCEDURE — 73080 X-RAY EXAM OF ELBOW: CPT

## 2022-08-16 PROCEDURE — 81015 MICROSCOPIC EXAM OF URINE: CPT

## 2022-08-16 PROCEDURE — 85610 PROTHROMBIN TIME: CPT

## 2022-08-16 NOTE — RAD REPORT
EXAM DESCRIPTION:  CT - Head C Spine Mpr Wo Con - 8/16/2022 7:27 pm

 

CLINICAL HISTORY:  Head and neck injury status post fall. Head and neck pain

 

COMPARISON:  June 2022

 

TECHNIQUE:  Computed axial tomography of the head and cervical spine was obtained.

 

Sagittal and coronal reconstruction was performed.

 

All CT scans are performed using dose optimization technique as appropriate and may include automated
 exposure control or mA/KV adjustment according to patient size.

 

FINDINGS:  An intracranial bleed is not seen.

 

Moderate low-density areas within periventricular, deep subcortical white matter bilaterally probably
 ischemic changes secondary small vessel disease.

 

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 The ventricles are normal in caliber. An extra-axial 
fluid collection is not noted.Fluid within the visualized sinuses and mastoids is not seen. Chronic s
phenoid sinusitis

 

A cervical fracture is not visualized. No dislocation is noted.

 

IMPRESSION:  No acute intracranial abnormality is seen.

 

A cervical fracture is not visualized.  If the patient continues to have symptoms to suggest intracra
nial /spinal cord pathology then MRI would be recommended

## 2022-08-16 NOTE — ER
Nurse's Notes                                                                                     

 [facility]                                                                 

Name: Tu Marie                                                                                 

Age: 72 yrs                                                                                       

Sex: Male                                                                                         

: 1950                                                                                   

MRN: [id number]                                                                                   

Arrival Date: 2022                                                                          

Time: 18:44                                                                                       

Account#: [id number]                                                                            

Bed 24                                                                                            

Private MD:                                                                                       

Diagnosis: Fall on same level, unspecified;Contusion, Head, Left Elbow;Abrasion, Skin Tear, Left  

  Elbow;Type 2 diabetes mellitus with hyperglycemia                                               

                                                                                                  

Presentation:                                                                                     

                                                                                             

18:45 Chief complaint: EMS states: Toned out for mechanical fall, pt hit left side of head    jl7 

      and left elbow, small laceration and hematoma noted to left side of head and skin tear      

      noted to left elbow, pt A\T\Ox4 on arrival. Care prior to arrival: Bleeding of injury       

      controlled. Injury dressed. Mechanism of Injury: Fall from standing position. Trauma        

      event details: Injury occurred in the [facility], Injury occurred: at home.         

      Injury occurred: 2022 Injury occurred at: 18:00.                                 

18:45 Acuity: GERMAN 2                                                                           [facility] 

18:45 Method Of Arrival: EMS: [facility]                                                       [facility] 

18:53 Coronavirus screen: Vaccine status: Patient reports receiving the 2nd dose of the covid jl7 

      vaccine. At this time, the client does not indicate any symptoms associated with            

      coronavirus-19. Ebola Screen: No symptoms or risks identified at this time. Initial         

      Sepsis Screen: Does the patient meet any 2 criteria? No. Patient's initial sepsis           

      screen is negative. Does the patient have a suspected source of infection? No.              

      Patient's initial sepsis screen is negative. Risk Assessment: Do you want to hurt           

      yourself or someone else? Patient reports no desire to harm self or others. Onset of        

      symptoms was 2022 at 18:00.                                                      

                                                                                                  

Triage Assessment:                                                                                

20:29 General: Appears in no apparent distress. Behavior is appropriate for age.              ke1 

21:00 Pain: Denies pain.                                                                      ke1 

                                                                                                  

Historical:                                                                                       

- Allergies:                                                                                      

19:01 Codeine;                                                                                jl7 

19:01 Fentanyl;                                                                               jl7 

19:01 Xanax;                                                                                  jl7 

- Home Meds:                                                                                      

19:01 digoxin 125 mcg (0.125 mg) Oral tab 1 tab once daily [Active]; ferrous sulfate 325 mg   jl7 

      (65 mg iron) Oral tab daily [Active]; Lantus 100 unit/mL Sub-Q soln 18 unit daily           

      [Active]; Insulin: Novolin R Sub-Q [Active]; losartan 100 mg Oral tab once daily            

      [Active]; Multiple Vitamins Oral tab daily [Active]; Protonix 40 mg Oral TbEC 1 tab 2       

      times per day [Active]; spironolactone 25 mg Oral tab 0.5 tab once daily [Active];          

      tamsulosin 0.4 mg Oral cp24 1 cap once daily [Active]; Victoza 2-Rj subcutaneous once      

      daily [Active]; Warfarin Oral [Active];                                                     

- PMHx:                                                                                           

19:01 CAD; CHF; Diabetes - IDDM; Hyperlipidemia; Hypertension;                                jl7 

- PSHx:                                                                                           

19:01 LVAD;                                                                                   jl7 

                                                                                                  

- Immunization history: Last tetanus immunization: < 5 years ago.                                 

- Social history:: Smoking status: Patient denies any tobacco usage or history of.                

                                                                                                  

                                                                                                  

Screenin:45 Abuse screen: Denies threats or abuse. Denies injuries from another. Tuberculosis       jl7 

      screening: No symptoms or risk factors identified.                                          

                                                                                             

00:19 Nutritional screening: No deficits noted. Fall Risk Fall in past 12 months (25 points). ke1 

                                                                                                  

Primary Survey:                                                                                   

                                                                                             

18:45 NO uncontrolled hemorrhage observed. A: The client is awake and alert. The airway is    jl7 

      patent. The client is alert. Airway: patent. Breathing/Chest: Spontaneous respiratory       

      effort, equal unlabored respirations, breath sounds clear bilaterally, regular pattern,     

      symmetrical chest rise and fall. Respiratory effort: spontaneous, Breath sounds: clear,     

      bilaterally. Respiratory pattern: regular, Chest inspection: symmetrical rise and fall      

      of the chest. Circulation: Hemorrhage: No external hemorrhage noted. Pulses: absent,        

      LVAD pt Skin color: pink, Skin temperature: warm, Heart tones absent. LVAD pt.              

      Disability Pupils are equal, round, reactive to light and accommodation. Client is          

      alert. Exposure/Environment: All clothing and personal items were removed. Forensic         

      evidence collection is not deemed to be indicated at this time. Items placed in patient     

      belonging bag. A warming method has been applied: A warm blanket has been provided to       

      the patient.                                                                                

21:00 Reassessment Breathing: Respiratory effort Spontaneous Breath sounds Clear Respiratory  ke1 

      pattern Regular.                                                                            

                                                                                                  

Secondary Survey:                                                                                 

                                                                                             

00:16 HEENT: Head Other laceration left side Face No injury/deformity Eyes: No injury or      ke1 

      deformity noted. Ears: clear bilaterally. Nose: clear to bilateral nares. Throat: No        

      injury or deformity noted. Gastrointestinal: Abdomen is soft, flat, Bowel sounds            

      present in all quadrants. Palpation No deficit noted Patient is incontinent of stool.       

      : Martinez in place Urine is clear. Musculoskeletal: Capillary refill < 3 seconds.           

      Injury Description: Skin tears sustained to left elbow.                                     

                                                                                                  

Vital Signs:                                                                                      

                                                                                             

18:45  / 85; Pulse 81; Resp 20 S; Temp 99.3(O); Pulse Ox 94% on R/A; Weight 77.11 kg    jl7 

      (R); Height 5 ft. 7 in. (170.18 cm) (R); Pain 6/10;                                         

23:30  / 82; Pulse 80; Resp 17; Pulse Ox 95% on R/A;                                    ke1 

18:45 Body Mass Index 26.63 (77.11 kg, 170.18 cm)                                             jl7 

                                                                                                  

[place] Coma Score:                                                                               

18:45 Eye Response: spontaneous(4). Verbal Response: oriented(5). Motor Response: obeys       jl7 

      commands(6). Total: 15.                                                                     

                                                                                                  

Trauma Score (Adult):                                                                             

18:45 Eye Response: spontaneous(1); Verbal Response: oriented(1); Motor Response: obeys       jl7 

      commands(2); Systolic BP: > 89 mm Hg(4); Respiratory Rate: 10 to 29 per min(4); [place]     

      Score: 15; Trauma Score: 12                                                                 

                                                                                                  

ED Course:                                                                                        

18:44 Patient arrived in ED.                                                                  jl7 

18:45 Patient has correct armband on for positive identification. Placed in gown. Bed in low  jl7 

      position. Call light in reach. Side rails up X2.                                            

18:45 Patient maintains SpO2 saturation greater than 95% on room air. Thermoregulation: warm  jl7 

      blanket given to patient.                                                                   

18:46 Dane Stone DO is Attending Physician.                                                ms3 

18:48 Triage completed.                                                                       jl7 

19:03 Attending Physician role handed off by Dane Stone DO                                 mh7 

19:03 Hakan Mendoza MD is Attending Physician.                                             mh7 

19:06 Lee Reilly, BRANT is Primary Nurse.                                                      jl7 

19:23 Primary Nurse role handed off by Lee Reilly RN                                       mw2 

19:30 CT Head C Spine In Process Unspecified.                                                 EDMS

19:32 Vazquez Rios, BRANT is Primary Nurse.                                                 ke1 

19:50 Elbow Left 3 View XRAY In Process Unspecified.                                          EDMS

19:50 XRAY Chest (1 view) In Process Unspecified.                                             EDMS

20:18 Inserted saline lock: 22 gauge in right antecubital area, using aseptic technique.      ke1 

                                                                                             

00:14 No provider procedures requiring assistance completed. IV discontinued.                 ke1 

                                                                                                  

Administered Medications:                                                                         

                                                                                             

23:44 Not Given (Patient Refused; per wife no need): NS 0.9% 500 ml IV at bolus once          ke1 

                                                                                                  

                                                                                                  

Medication:                                                                                       

                                                                                             

00:19 VIS not applicable for this client.                                                     ke1 

                                                                                                  

Outcome:                                                                                          

                                                                                             

22:11 Discharge ordered by MD.                                                                7 

                                                                                             

00:19 Discharged to home via wheelchair, with family.                                         ke1 

      Condition: good                                                                             

      Discharge instructions given to family, wife                                                

00:22 Patient left the ED.                                                                    ke1 

                                                                                                  

Signatures:                                                                                       

Dispatcher [employer]                           Lee Sterling RN                        RN   jl7                                                  

Sharona Crowder                            mw2                                                  

Dane Stone DO DO   ms3                                                  

Hakan Mendoza MD MD mh7                                                  

Vazquez Rios RN                   RN   ke1                                                  

                                                                                                  

Corrections: (The following items were deleted from the chart)                                    

                                                                                             

19:05 18:45 Circulation: Hemorrhage: No external hemorrhage noted. Pulses: palpable right     jl7 

      radial artery and left radial artery. Skin color: pink, Skin temperature: warm, Heart       

      tones present. jl7                                                                          

                                                                                                  

**************************************************************************************************

## 2022-08-16 NOTE — EDPHYS
Physician Documentation                                                                           

 [facility]                                                                 

Name: Tu Marie                                                                                 

Age: 72 yrs                                                                                       

Sex: Male                                                                                         

: 1950                                                                                   

MRN: [id number]                                                                                   

Arrival Date: 2022                                                                          

Time: 18:44                                                                                       

Account#: [id number]                                                                            

Bed 24                                                                                            

Private MD:                                                                                       

ED Physician Hakan Mendoza                                                                      

HPI:                                                                                              

                                                                                             

19:24 This 72 yrs old Male presents to ER via EMS with complaints of Fall Injury.             ms3 

19:24 Details of fall: The patient fell from an upright position, while standing. Onset: The  ms3 

      symptoms/episode began/occurred just prior to arrival. Associated injuries: The patient     

      sustained injury to the head, contusion. Severity of symptoms: At their worst the           

      symptoms were moderate, in the emergency department the symptoms are unchanged.             

                                                                                                  

Historical:                                                                                       

- Allergies:                                                                                      

19:01 Codeine;                                                                                jl7 

19:01 Fentanyl;                                                                               jl7 

19:01 Xanax;                                                                                  jl7 

- Home Meds:                                                                                      

19:01 digoxin 125 mcg (0.125 mg) Oral tab 1 tab once daily [Active]; ferrous sulfate 325 mg   jl7 

      (65 mg iron) Oral tab daily [Active]; Lantus 100 unit/mL Sub-Q soln 18 unit daily           

      [Active]; Insulin: Novolin R Sub-Q [Active]; losartan 100 mg Oral tab once daily            

      [Active]; Multiple Vitamins Oral tab daily [Active]; Protonix 40 mg Oral TbEC 1 tab 2       

      times per day [Active]; spironolactone 25 mg Oral tab 0.5 tab once daily [Active];          

      tamsulosin 0.4 mg Oral cp24 1 cap once daily [Active]; Victoza 2-Rj subcutaneous once      

      daily [Active]; Warfarin Oral [Active];                                                     

- PMHx:                                                                                           

19:01 CAD; CHF; Diabetes - IDDM; Hyperlipidemia; Hypertension;                                jl7 

- PSHx:                                                                                           

19:01 LVAD;                                                                                   jl7 

                                                                                                  

- Immunization history: Last tetanus immunization: < 5 years ago.                                 

- Social history:: Smoking status: Patient denies any tobacco usage or history of.                

                                                                                                  

                                                                                                  

ROS:                                                                                              

19:24 Constitutional: Negative for fever, and chills. Neck: Negative for injury, pain, and    ms3 

      swelling, Cardiovascular: Negative for chest pain, and palpitations. Respiratory:           

      Negative for shortness of breath, cough, wheezing, and pleuritic chest pain,                

      Abdomen/GI: Negative for abdominal pain, nausea, vomiting, diarrhea, and constipation,      

      MS/Extremity: Negative for injury and deformity.                                            

19:24 MS/extremity: Positive for headache.                                                        

19:24 Skin: Positive for ecchymosis, of the left head.                                            

                                                                                                  

Exam:                                                                                             

19:24 Constitutional:  This is a well developed, well nourished patient who is awake, alert,  ms3 

      and in no acute distress.                                                                   

19:24 Neck:  Trachea midline, no cervical lymphadenopathy.  Supple, full range of motion          

      without nuchal rigidity, or vertebral point tenderness.  No Meningismus. Chest/axilla:      

      Normal chest wall appearance and motion.  Nontender with no deformity.   Respiratory:       

      Lungs have equal breath sounds bilaterally, clear to auscultation and percussion.  No       

      rales, rhonchi or wheezes noted.  No increased work of breathing, no retractions or         

      nasal flaring. Abdomen/GI:  Soft, non-tender, with normal bowel sounds.  No distension      

      or tympany.  No guarding or rebound.  No evidence of tenderness throughout. Skin:           

      Warm, dry with normal turgor.  Normal color with no rashes, no lesions, and no evidence     

      of cellulitis.                                                                              

19:24 Head/face: Noted is contusion, that is superficial, of the  left temple.                    

19:24 Cardiovascular: LVAD whirl.                                                                 

19:24 Skin: injury, skin tear left elbow.                                                         

                                                                                                  

Vital Signs:                                                                                      

18:45  / 85; Pulse 81; Resp 20 S; Temp 99.3(O); Pulse Ox 94% on R/A; Weight 77.11 kg    jl7 

      (R); Height 5 ft. 7 in. (170.18 cm) (R); Pain 6/10;                                         

23:30  / 82; Pulse 80; Resp 17; Pulse Ox 95% on R/A;                                    ke1 

18:45 Body Mass Index 26.63 (77.11 kg, 170.18 cm)                                             jl7 

                                                                                                  

Octavio Coma Score:                                                                               

18:45 Eye Response: spontaneous(4). Verbal Response: oriented(5). Motor Response: obeys       jl7 

      commands(6). Total: 15.                                                                     

                                                                                                  

Trauma Score (Adult):                                                                             

18:45 Eye Response: spontaneous(1); Verbal Response: oriented(1); Motor Response: obeys       jl7 

      commands(2); Systolic BP: > 89 mm Hg(4); Respiratory Rate: 10 to 29 per min(4); [place]     

      Score: 15; Trauma Score: 12                                                                 

                                                                                                  

MDM:                                                                                              

18:46 Patient medically screened.                                                             ms3 

19:24 Differential diagnosis: abrasion, closed head injury, fracture.                         ms3 

22:04 Data reviewed: vital signs, nurses notes, old medical records, lab test result(s), CBC, mh7 

      electrolytes, urinalysis, radiologic studies, CT scan, plain films. Data interpreted:       

      Pulse oximetry: on room air is 96 %. Interpretation: normal. Counseling: I had a            

      detailed discussion with the patient and/or guardian regarding: the historical points,      

      exam findings, and any diagnostic results supporting the discharge/admit diagnosis, the     

      presence of at least one elevated blood pressure reading (>120/80) during this              

      emergency department visit, lab results, radiology results, the need for outpatient         

      follow up, to return to the emergency department if symptoms worsen or persist or if        

      there are any questions or concerns that arise at home. Response to treatment: the          

      patient's symptoms have markedly improved after treatment. ED course: feels better,         

      NAD, VSS, NVI, no focal neurological deficits. Discussed all test results and findings      

      with the patient and his wife. They state that his blood glucose is elevated due to him     

      having a coke and sweets. He also has a chronic Martinez catheter and is on Cipro. They        

      request to be discharged home at this time..                                                

                                                                                                  

                                                                                             

18:46 Order name: Basic Metabolic Panel; Complete Time: 20:41                                 ms3 

                                                                                             

18:46 Order name: CBC with Diff; Complete Time: 20:41                                         ms3 

                                                                                             

18:47 Order name: PT-INR; Complete Time: 20:41                                                ms3 

                                                                                             

21:08 Order name: Urine Dipstick-Ancillary; Complete Time: 21:22                              EDMS

                                                                                             

21:22 Order name: Urine Microscopic Only                                                      mh7 

                                                                                             

18:46 Order name: CT Head C Spine; Complete Time: 19:56                                       ms3 

                                                                                             

18:46 Order name: XRAY Chest (1 view); Complete Time: 20:02                                   ms3 

                                                                                             

18:46 Order name: Labs collected and sent; Complete Time: 20:19                               ms3 

                                                                                             

18:46 Order name: Elbow Left 3 View XRAY; Complete Time: 20:02                                ms3 

                                                                                             

20:42 Order name: Urine Dipstick-Ancillary (obtain specimen); Complete Time: 00:21            [facility] 

                                                                                                  

Administered Medications:                                                                         

23:44 Not Given (Patient Refused; per wife no need): NS 0.9% 500 ml IV at bolus once          ke1 

                                                                                                  

                                                                                                  

Disposition Summary:                                                                              

22 22:11                                                                                    

Discharge Ordered                                                                                 

      Location: Home                                                                          [facility] 

      Problem: new                                                                            [facility] 

      Symptoms: have improved                                                                 [facility] 

      Condition: Stable                                                                       [facility] 

      Diagnosis                                                                                   

        - Fall on same level, unspecified                                                     7 

        - Contusion, Head, Left Elbow                                                         mh7 

        - Abrasion, Skin Tear, Left Elbow                                                     mh7 

        - Type 2 diabetes mellitus with hyperglycemia                                         [facility] 

      Followup:                                                                               [facility] 

        - With: Private Physician                                                                  

        - When: 1 - 2 days                                                                         

        - Reason: Worsening of condition, Recheck today's complaints, Continuance of care,         

      Re-evaluation by your physician                                                             

      Discharge Instructions:                                                                     

        - Discharge Summary Sheet                                                             [facility] 

        - Hyperglycemia                                                                       mh7 

        - Abrasion, Easy-to-Read                                                              mh7 

        - Skin Tear, Easy-to-Read                                                             mh7 

        - Fall Prevention in the Home, Adult, Easy-to-Read                                    mh7 

        - Facial or Scalp Contusion, Easy-to-Read                                             mh7 

        - Elbow Contusion, Easy-to-Read                                                       7 

      Forms:                                                                                      

        - Medication Reconciliation Form                                                      [facility] 

        - Thank You Letter                                                                    [facility] 

        - Antibiotic Education                                                                [facility] 

        - Prescription Opioid Use                                                             [facility] 

Signatures:                                                                                       

Dispatcher [employer]                           EDLee Whyte RN                        RN   7                                                  

Dane Stone DO DO   ms3                                                  

Hakan Mendoza MD MD   7                                                  

Vazquez Rios RN   ke1                                                  

                                                                                                  

**************************************************************************************************

## 2022-08-16 NOTE — RAD REPORT
EXAM DESCRIPTION:  RAD - Elbow Left 3 View - 8/16/2022 7:48 pm

 

CLINICAL HISTORY:  Left elbow pain status post trauma

 

FINDINGS:  Limited examination as the patient's elbow was not flexed.

 

No gross fracture or dislocation seen.

## 2022-08-16 NOTE — RAD REPORT
EXAM DESCRIPTION:  Praveena Single View8/16/2022 7:48 pm

 

CLINICAL HISTORY:  Chest pain

 

COMPARISON:  July 2022

 

FINDINGS:   The lungs appear clear of acute infiltrate. The heart is mildly enlarged. Pacemaker leads
 place. Chronic elevation right hemidiaphragm HISTORY AND PHYSICAL  Patient: Eli Alberto Date: 8/16/2021   female, 81 year old  Admit Date: 8/16/2021   Attending: Harman eSo DO    DATE OF SERVICE 8/16/2021  PRIMARY CARE PROVIDER:  Davide Taylor MD   ATTENDING PHYSICIAN    Harman Seo DO     CHIEF COMPLAINT      weakness    HPI    Eli Alberto is a 81 year old female who presented from [facility] after staff noted she didn't call to get ready for breakfast as usual.  Last known well time was last evening when she went to bed.  When they went to check on her and noted left sided facial droop, drooling and right arm numbness and flaccidity.  Family notes she had been there rehabilitating for the past few weeks and was showing great progress.  Called them on the phone yesterday to discuss her hope to get home soon and was able to ambulate with less assistance over the past week.  Patient unable to provide any history for me at this time.    PAST MEDICAL & SURGICAL HISTORY    Past Medical History:   Diagnosis Date   • Abdominal wall hematoma 5/27/2021   • Acute posterior epistaxis 1/6/2019   • ROMELIA (acute kidney injury) (CMS/HCC)    • Anemia 6/8/2015   • Garcia's esophagus    • Blood clot associated with vein wall inflammation    • Bronchitis    • CAD (coronary artery disease)    • Cerebral infarction (CMS/HCC)    • Chronic back pain 9/3/2013   • Chronic kidney disease    • Chronic pain    • Chronic pain syndrome    • Congestive heart failure (CMS/HCC)    • Dehydration 3/27/2016   • Depressive disorder, not elsewhere classified    • DJD (degenerative joint disease) 10/9/2014   • Essential hypertension, benign    • Failed back syndrome of lumbar spine    • Gastric cancer (CMS/HCC) 1987    2/3RDS OF COLON REMOVED   • H/O lumbosacral spine surgery 10/24/2014   • Hallucinations 10/3/2019   • History of blood transfusion    • History of peristent atrial fibrillation 4/27/2018   • Hypercholesterolemia    • Hyponatremia 12/15/2018    • Hypotension 5/28/2021   • Insomnia    • Large bowel obstruction (CMS/[facility])    • Long term current use of opiate analgesic 10/24/2014   • Lumbar spondylosis 10/24/2014   • Mesenteric ischemia (CMS/[facility])    • Myoclonus     hypnagogic myoclonic disoder, treated with Depakote   • NSTEMI (non-ST elevated myocardial infarction) (CMS/[facility]) 7/29/2020   • Orthostatic dizziness 3/8/2017   • Orthostatic hypotension 3/22/2017   • Osteoarthritis of both knees    • Pain management     pain management agreement with Dr. Green   • Pericardial effusion, acute 8/2/2013   • Pneumonia    • PONV (postoperative nausea and vomiting)    • RAD (reactive airway disease)    • Recurrent UTI (urinary tract infection)     hx of E.coli, MDRO in urine   • S/P ascending aortic aneurysm repair 8/7/2020   • Sleep apnea     couldn't use machine    • Somnolence 8/16/2016   • Syncope and collapse 7/30/2016   • Thyroid condition    • Uncomplicated senile dementia (CMS/[facility])    • Urinary incontinence      Past Surgical History:   Procedure Laterality Date   • Appendectomy     • Back surgery      Dr. Belcher L4-5, L5-S1 PLIF; [facility] 05/2000   • Bilroth i procedure  1987    Gastric Cancer   • Bowel resection  2004    for large bowel obstruction    • Cardiac monitoring     • Cardiac surgery     • Colectomy  2004    mesenteric ischemia   • Colonoscopy diagnostic  07/01/2007, 5/26/2017   • Echo m-mode/2d/doppler (routine)  08/06/2013   • Echocardiogram     • Egd  03/2021   • Electrocardiogram  08/02/2013   • Esophagogastroduodenoscopy  04/22/2016   • Esophagogastroduodenoscopy  05/26/2017   • Fusion mc-p joint,single Left 05/05/2016    SI Joint Fusion - Dr John   • Hemorrhoidectomy,external  03/15/2018    Hilton Buck MD   • Hernia repair     • Intrathecal pump implantation Left 05/20/2021    pain pump   • Laminotomy  04/23/2018    lead spinal cord stimulator paddle lead, with laminotomies at T9-10 and T10-11./placement of a generator   • Laminotomy  04/24/2018     T5-6 and T6-7   • Nm kirsten per rst/strs pharmacolo     • Nose surgery     • Pacemaker implant  01/05/2021    Dr. Georgia Quintana   • Pain clinic  10/2014   • Pap smear,routine  06/12/2009   • Pericardiocentesis  08/03/2013    placement of pericardial drain   • Rectal examination under anesthesia  03/22/2018    Hilton Buck MD   • Removal gallbladder      Cholecystectomy (gallbladder)   • Removal of infusion pump  07/11/2021    Dr. Belcher   • Sinus surgery     • Spinal cord stimulator implant  04/21/2018    Placed by [facility]   • Tonsillectomy     • Total abdominal hysterectomy w/ bilateral salpingoophorectomy  1970's    dx:menorrhagia   • Us carotid duplex bilateral  09/16/2013       CURRENT MEDICATIONS- medication list and allergies personally reviewed in [facility]  (Not in a hospital admission)    ALLERGIES  ALLERGIES:   Allergen Reactions   • Digoxin Other (See Comments)     Confusion/lethargy: do not use   • Penicillins RASH     Has tolerated ceftriaxone and received ceftin as OP. Also tolerated two doses of cefazolin     • Trazodone Other (See Comments)     INTOLERANT TO   • Trileptal Other (See Comments)     Hyponatremia noted on 7/28/20.   • Zoloft Other (See Comments)     INTOLERANT TO     SOCIAL HISTORY    Social History     Tobacco Use   • Smoking status: Passive Smoke Exposure - Never Smoker   • Smokeless tobacco: Never Used   • Tobacco comment:  smoked   Vaping Use   • Vaping Use: Never assessed   Substance Use Topics   • Alcohol use: No     Alcohol/week: 0.0 standard drinks     Comment: NONE    • Drug use: No     Comment: NONE      FAMILY HISTORY    Family History   Problem Relation Age of Onset   • Heart Mother         murmur   • High blood pressure Mother    • Thyroid Mother    • Cancer, Breast Mother    • Stroke Mother    • Stroke Father    • High blood pressure Father    • Heart Father         cardiac pacemaker   • Dementia/Alzheimers Father    • Cancer Daughter      REVIEW  OF SYSTEMS    All 14 Systems were reviewed and found to be negative except what's mentioned in HPI    PHYSICAL EXAMINATION    Vital 24 Hour Range Most Recent Value   Temperature Temp  Min: 98.4 °F (36.9 °C)  Max: 98.4 °F (36.9 °C) 98.4 °F (36.9 °C)   Pulse Pulse  Min: 59  Max: 75 (!) 59   Respiratory Resp  Min: 11  Max: 22 (!) 22   Blood Pressure BP  Min: 158/72  Max: 186/86 (!) 158/72   Pulse Oximetry SpO2  Min: 91 %  Max: 97 % 91 %   General: obtunded, does moan with attempts to examine/reposition  Head/ENT:  Normocephalic/atraumatic; pupils are equal, reactive but sluggish, oral/nasal mucosa dry; hearing grossly intact.  Neck:  Supple; trachea is midline with no cervical or supraclavicular lymphadenopathy; no thyromegaly  Respiratory: Decreased breathsounds at the bases; no use of accessory muscles; no tactile fremitus  Cardiovascular: Slightly irregular; no jugular venous distention; no Carotid Bruit  Abdomen:  Soft; nontender/nondistended;  + bowel sounds; No guarding or rebound; No peritoneal signs; no hepatosplenomegaly.  LLQ abdominal wound with packing in place.  No erythema or drainage surrounding site.  Extremities/Skin: cyanosis absent; warm;   Nonpitting pretibial edema.  Neurologic: opens eyes to verbal stimulus and moans with attempt to reposition.  Left sided facial droop noted.  Tongue rolled to the left in mouth, unable to perform full neuro evaluation due to decreased mentation.  Unable to hold extremities up against gravity.  Weak grasps bilaterally but again this is limited examination due to patient inability to follow commands.        LABS    Recent Labs   Lab 08/16/21  1022 08/16/21  1021   SODIUM 139  --    POTASSIUM 4.6  --    CHLORIDE 103  --    CO2 33*  --    BUN 12  --    CREATININE 1.07*  1.00*  --    GLUCOSE 110*  --    WBC 6.2  --    HGB 13.1  --    HCT 42.5  --      --    PT  --  12.8*   INR  --  1.2   PTT  --  33*     Recent Labs   Lab 08/16/21  1022   RAPDTR <0.02     No  results found. However, due to the size of the patient record, not all encounters were searched. Please check Results Review for a complete set of results.   No results found. However, due to the size of the patient record, not all encounters were searched. Please check Results Review for a complete set of results.  CT Angio Head and Neck Level 1    Result Date: 8/16/2021  CT ANGIOGRAM OF THE INTRACRANIAL AND NECK VASCULATURE INDICATION:  Stroke/TIA, assess intracranial arteries, Stroke/TIA, assess extracranial arteries COMPARISON:  Prior studies of the most recent performed on 8/16/2021. TECHNIQUE:  CT angiogram of the intracranial vasculature and neck vasculature was performed. Approximately 75 mL of Omnipaque-350 contrast was administered intravenously. Coronal and sagittal reconstructions as well as 3-D MIPS images were performed. 3-D rendering was performed on an independent workstation. FINDINGS: CT HEAD WITH CONTRAST:  Mild age-appropriate central and cortical atrophy. Low density area identified within the right frontal lobe periventricular/subcortical white matter. This is new when compared to the CT head study from 5/23/2021. This is likely related to an area of age-indeterminate ischemia. Minimal periventricular probable chronic ischemic microvascular changes. No acute abnormal extra-axial fluid collections. No midline shift or ventriculomegaly. No abnormal areas of intracranial enhancement. Normal opacification of the intracranial venous vasculature. ANTERIOR CIRCULATION: RIGHT ICA: Petrous: Normal. Cavernous: Mild atherosclerotic calcified plaque. Supraclinoid: Mild atherosclerotic calcified plaque. LEFT ICA: Petrous: Normal. Cavernous: Mild atherosclerotic calcified plaque. Supraclinoid: Normal. RIGHT OPTHALMIC ARTERY: Normal. LEFT OPTHALMIC ARTERY: Normal. RIGHT MIDDLE CEREBRAL ARTERY: M1(Horizontal): Normal. M2(Insular): Normal. M3(Opercular): Normal. M4(Cortical): Normal. LEFT MIDDLE CEREBRAL  ARTERY: M1(Horizontal): Normal. M2(Insular): Normal. M3(Opercular): Normal. M4(Cortical): Normal. RIGHT ANTERIOR CEREBRAL ARTERY: A1(precommunicating): Normal. A2 (postcommunicating): Normal. A3(distal): Normal. LEFT ANTERIOR CEREBRAL ARTERY: A1(precommunicating): Normal. A2 (postcommunicating): Normal. A3(distal): Normal. ANTERIOR COMMUNICATING ARTERY: Normal. POSTERIOR CIRCULATION: The vertebral arteries are codominant. INTRADURAL RIGHT VERTEBRAL ARTERY: Normal. INTRADURAL LEFT VERTEBRAL ARTERY: Normal. RIGHT PICA: Normal. LEFT PICA: Normal. RIGHT AICA: Normal. LEFT AICA: Normal. BASILAR ARTERY: Normal.  RIGHT SUPERIOR CEREBELLAR ARTERY:  Normal. LEFT SUPERIOR CEREBELLAR ARTERY: Normal. RIGHT PCA: P1(Precommunicating): Normal. P2(Ambient): Normal. P3(Quadrigeminal): Normal. P4(Calcarine): Normal. RIGHT PCOMM: Not visualized. LEFT PCA: P1(Precommunicating): Normal. P2(Ambient): Normal. P3(Quadrigeminal): Normal. P4(Calcarine): Normal. LEFT PCOMM: Not visualized. CT ANGIOGRAM OF THE NECK VASCULATURE: AORTIC ARCH: Nonhemodynamically significant atherosclerotic plaque. The brachiocephalic artery, left common carotid artery, and left subclavian artery originate from the aortic arch. Mild nonhemodynamically significant atherosclerotic plaque at the origins of the great vessels.  RIGHT COMMON CAROTID ARTERY: The right common carotid artery originates from the right brachiocephalic artery. The right common carotid artery follows a normal course in the upper chest and neck. No hemodynamically significant stenosis noted. LEFT COMMON CAROTID ARTERY: The left common carotid artery originates from the aortic arch. The left common carotid artery follows a normal course in the upper chest and neck. No hemodynamically significant stenosis noted.. CERVICAL RIGHT INTERNAL CAROTID ARTERY: Mild nonhemodynamically significant atherosclerotic plaque identified in the proximal cervical right internal carotid artery without any  significant narrowing. The mid to distal right cervical internal carotid artery is unremarkable, without hemodynamically significant stenosis noted. CERVICAL LEFT INTERNAL CAROTID ARTERY: Mild nonhemodynamically significant atherosclerotic plaque identified in the proximal cervical left internal carotid artery without any significant narrowing. The mid to distal left cervical internal carotid artery is unremarkable, without hemodynamically significant stenosis noted. RIGHT EXTERNAL CAROTID ARTERY: Normal. LEFT EXTERNAL CAROTID ARTERY: Normal. POSTERIOR CIRCULATION: The vertebral arteries are codominant. CERVICAL RIGHT VERTEBRAL ARTERY: The right vertebral artery originates from the right subclavian artery. The origin of the right vertebral artery is unremarkable in appearance. V1(extraosseous; arch-C6): Normal.  V2(foraminal; C6-C1): Normal.  V3(extraspinal; C1-foramen magnum): Normal.  CERVICAL LEFT VERTEBRAL ARTERY: The left vertebral artery originates from the left subclavian artery.  The origin of the left vertebral artery is unremarkable in appearance. V1(extraosseous; arch-C6): Normal.  V2(foraminal; C6-C1): Normal.  V3(extraspinal; C1-foramen magnum): Normal.  The soft tissues of the neck demonstrate no enlarged masses or enlarged lymphadenopathy. The lung apices are unremarkable in appearance. Multilevel degenerative changes of the cervical spine. Right-sided neural foraminal narrowing noted at C3-4, bilaterally at C4-5, and bilaterally at C5-6. Spinal canal narrowing noted at the C4-5 level. Mucosal thickening within the right frontal sinus, right-sided ethmoid air cells, bilateral maxillary antrum, and right aspect of the sphenoid sinus. Bilateral mastoid air cells are unremarkable in appearance. Minimal probable cerumen in bilateral external auditory canals. Partially pneumatized bilateral petrous apices. Age related calcifications of the left orbit. The orbits are otherwise unremarkable in appearance.  The thyroid gland is unremarkable in appearance. Degenerative changes of bilateral TMJs. Determination of the degree of stenosis in the internal carotid arteries is obtained using measurements of distal internal carotid diameter as the denominator for stenosis measurement.  The method utilized is similar to that utilized in the [employer] American Symptomatic Carotid Endarterectomy (NASCET) and/or WASID (Warfarin vs. Aspirin Symptomatic Intracranial Disease) trials.  If the degree of stenosis is greater than 30%, the actual percentage stenosis is given in the body of the report.     IMPRESSION:  1. No hemodynamically significant stenosis identified involving the arterial vasculature of the head and neck. 2. . Low density area identified within the right frontal lobe periventricular/subcortical white matter. This is new when compared to the CT head study from 5/23/2021. This is likely related to an area of age-indeterminate ischemia. 3. Mild age-appropriate central and cortical atrophy and minimal probable chronic ischemic white matter changes. 4. Degenerative changes of the cervical spine, as described above. 5. Paranasal sinus mucosal disease, as described above. 6. If symptoms persist, or if clinically indicated, consider further evaluation with an MRI brain.     CT Head Level 1    Result Date: 8/16/2021  CT HEAD LEVEL 1 CLINICAL INFORMATION:  Acute Stroke Alert presenting with Neuro deficit, acute, stroke suspected, L facial droop, dysarthria. COMPARISON:  Multiple prior studies most recent 5/23/2021. TECHNIQUE:  Noncontrast head CT spanning cranial vertex through foramen magnum.  Coronal and sagittal reformats. FINDINGS:  Moderate polypoid mucosal thickening within the visualized paranasal sinuses, including subtotal opacification of the right frontal sinus with probable retention cyst or polyp (3/43), similar to previous and without aggressive features. The calvarium, skull base, and mastoid air cells are  unremarkable. Degenerative changes bilateral temporomandibular joints. Chronic ossification left frontal calvarium (3/56), unchanged from CT 10/3/2019. No acute intracranial hemorrhage, mass effect, midline shift, or pathologic extra-axial fluid collection.  Diffuse atherosclerotic disease. No evidence of hyperdense intravascular thrombosis. No CT evidence of a large territorial vascular insult although MRI is more sensitive for detection of acute ischemia. Mild patchy periventricular white matter hypoattenuation, similar to previous and most compatible with chronic microvascular ischemic change.  Mild prominence of the ventricles and sulci compatible with expected age related parenchymal atrophy.     IMPRESSION:  No acute intracranial abnormalities, specifically no acute intracranial hemorrhage.  Results were communicated to PAU LANGE MD on 8/16/2021 11:03 AM.\"       EKG- PER ED provider review as images not available to me at this time Wide QRS rate in the 60's    CODE STATUS- Full Resuscitation confirmed with daughters who are activated DPOA's that they would want all resuscitative efforts at this time.  Their decision on this may change based on workup.    ASSESSMENT & PLAN:    · Right sided weakness with concern for acute stroke with history of prior stroke-  NPO due to significantly altered mental status and concern for acute stroke.  MRI brain, therapy evaluations.  Given patients recent intrathecal pain pump infection and nerve stimulator in place, occult infection may need to be entertained although patient is without fever or leukocytosis.  Lumbar puncture unable to be completed at this time given patient received Eliquis last night.    · Chronic pain syndrome- hold pain mediation for now, family understanding of risks of IV narcotics and skewing ability to see mentation.    · Essential hypertension- allow for permissive hypertension in the setting of stroke workup.  PRN Labetolol.    · Chronic  systolic heart faliure- no signs of exacerbation, follow.    · Dementia with activated DPOA-HC- plan of care discussed with daughters at bedside.    · Obesity and obstructive sleep apnea- long term weight reduction indicated.  Patient previously intolerant of CPAP.    · Recent abdominal wall abscess s/p removal of intrathecal morphine pump- wound care consultation.    · Coronary artery disease- Aspirin rectal until able to begin PO.    · Persistent atrial fibrillation- will have to hold oral medications pending neuro workup.    · Essential tremor- follow, resume home regimen when able.    · Hypothyroidism- check TSH    · Insomnia-  follow, resume home regimen when able.    · Depression- follow, resume home regimen when able.    · DVT Prophylaxis-TEDs    · Disposition- stepdown inpatient         Please see H&P and MD Progress Notes for additional information about the patient's course of treatment.    Caroline Pereira NP  8/16/2021  12:18 PM

## 2022-08-17 VITALS — TEMPERATURE: 99.3 F

## 2022-08-17 VITALS — SYSTOLIC BLOOD PRESSURE: 132 MMHG | DIASTOLIC BLOOD PRESSURE: 82 MMHG | OXYGEN SATURATION: 95 %

## 2022-09-15 ENCOUNTER — HOSPITAL ENCOUNTER (EMERGENCY)
Dept: HOSPITAL 97 - ER | Age: 72
Discharge: ANOTHER HOSPITAL | End: 2022-09-15
Payer: COMMERCIAL

## 2022-09-15 DIAGNOSIS — I50.1: ICD-10-CM

## 2022-09-15 DIAGNOSIS — N39.0: ICD-10-CM

## 2022-09-15 DIAGNOSIS — Z79.01: ICD-10-CM

## 2022-09-15 DIAGNOSIS — R53.1: Primary | ICD-10-CM

## 2022-09-15 DIAGNOSIS — I10: ICD-10-CM

## 2022-09-15 DIAGNOSIS — E83.42: ICD-10-CM

## 2022-09-15 DIAGNOSIS — E10.65: ICD-10-CM

## 2022-09-15 DIAGNOSIS — D64.9: ICD-10-CM

## 2022-09-15 DIAGNOSIS — Z20.822: ICD-10-CM

## 2022-09-15 LAB
ALBUMIN SERPL BCP-MCNC: 3.2 G/DL (ref 3.4–5)
ALP SERPL-CCNC: 134 U/L (ref 45–117)
ALT SERPL W P-5'-P-CCNC: 48 U/L (ref 12–78)
AST SERPL W P-5'-P-CCNC: 25 U/L (ref 15–37)
BUN BLD-MCNC: 36 MG/DL (ref 7–18)
GLUCOSE SERPLBLD-MCNC: 287 MG/DL (ref 74–106)
HCT VFR BLD CALC: 25.6 % (ref 39.6–49)
INR BLD: 1.59
LIPASE SERPL-CCNC: 82 U/L (ref 73–393)
LYMPHOCYTES # SPEC AUTO: 0.8 K/UL (ref 0.7–4.9)
MAGNESIUM SERPL-MCNC: 1.6 MG/DL (ref 1.8–2.4)
MCV RBC: 81.5 FL (ref 80–100)
NT-PROBNP SERPL-MCNC: 1933 PG/ML (ref ?–125)
PMV BLD: 7.3 FL (ref 7.6–11.3)
POTASSIUM SERPL-SCNC: 4.6 MMOL/L (ref 3.5–5.1)
RBC # BLD: 3.15 M/UL (ref 4.33–5.43)
TROPONIN I SERPL HS-MCNC: 18.6 PG/ML (ref ?–58.9)

## 2022-09-15 PROCEDURE — 71045 X-RAY EXAM CHEST 1 VIEW: CPT

## 2022-09-15 PROCEDURE — 99285 EMERGENCY DEPT VISIT HI MDM: CPT

## 2022-09-15 PROCEDURE — 83880 ASSAY OF NATRIURETIC PEPTIDE: CPT

## 2022-09-15 PROCEDURE — 96366 THER/PROPH/DIAG IV INF ADDON: CPT

## 2022-09-15 PROCEDURE — 84484 ASSAY OF TROPONIN QUANT: CPT

## 2022-09-15 PROCEDURE — 87811 SARS-COV-2 COVID19 W/OPTIC: CPT

## 2022-09-15 PROCEDURE — 87088 URINE BACTERIA CULTURE: CPT

## 2022-09-15 PROCEDURE — 87086 URINE CULTURE/COLONY COUNT: CPT

## 2022-09-15 PROCEDURE — 80048 BASIC METABOLIC PNL TOTAL CA: CPT

## 2022-09-15 PROCEDURE — 87040 BLOOD CULTURE FOR BACTERIA: CPT

## 2022-09-15 PROCEDURE — 80162 ASSAY OF DIGOXIN TOTAL: CPT

## 2022-09-15 PROCEDURE — 80076 HEPATIC FUNCTION PANEL: CPT

## 2022-09-15 PROCEDURE — 93005 ELECTROCARDIOGRAM TRACING: CPT

## 2022-09-15 PROCEDURE — 83605 ASSAY OF LACTIC ACID: CPT

## 2022-09-15 PROCEDURE — 81015 MICROSCOPIC EXAM OF URINE: CPT

## 2022-09-15 PROCEDURE — 36415 COLL VENOUS BLD VENIPUNCTURE: CPT

## 2022-09-15 PROCEDURE — 85025 COMPLETE CBC W/AUTO DIFF WBC: CPT

## 2022-09-15 PROCEDURE — 85610 PROTHROMBIN TIME: CPT

## 2022-09-15 PROCEDURE — 96365 THER/PROPH/DIAG IV INF INIT: CPT

## 2022-09-15 PROCEDURE — 83735 ASSAY OF MAGNESIUM: CPT

## 2022-09-15 PROCEDURE — 83690 ASSAY OF LIPASE: CPT

## 2022-09-15 PROCEDURE — 81003 URINALYSIS AUTO W/O SCOPE: CPT

## 2022-09-15 PROCEDURE — 96375 TX/PRO/DX INJ NEW DRUG ADDON: CPT

## 2022-09-15 PROCEDURE — 96361 HYDRATE IV INFUSION ADD-ON: CPT

## 2022-09-15 PROCEDURE — 74176 CT ABD & PELVIS W/O CONTRAST: CPT

## 2022-09-15 NOTE — ER
Nurse's Notes                                                                                     

 [facility]                                                                 

Name: Tu Marie                                                                                 

Age: 72 yrs                                                                                       

Sex: Male                                                                                         

: 1950                                                                                   

MRN: [id number]                                                                                   

Arrival Date: 09/15/2022                                                                          

Time: 16:41                                                                                       

Account#: [id number]                                                                            

Bed 19                                                                                            

Private MD: KEREN Horowitz C                                                                            

Diagnosis: Weakness;Left ventricular failure-with LVAD;Anemia, unspecified;Hypomagnesemia;UTI/    

  Urinary tract infection, site not specified;Type 1 diabetes mellitus with                       

  hyperglycemia;Long term (current) use of anticoagulants                                         

                                                                                                  

Presentation:                                                                                     

09/15                                                                                             

16:50 Chief complaint: Spouse and/or significant other states: he has recurrent uti's.        tw2 

      recently in [place] until the  for IV abx. he was supposed to stay        

      until Monday but infectious disease said he could switch over to oral medicine. he was      

      doing good. but we finished around the  and since then the past 4 days he has gotten     

      really tired to where all he wants to do is sleep. and his blood sugars have been           

      increasing on me. i did increase the lantus it went up. i am thinking he has an             

      infection somewhere and he has been coughing as well. Coronavirus screen: cough             

      unrelated to allergies, fatigue. Coronavirus screen: Client presents with at least one      

      sign or symptom that may indicate coronavirus-19. Standard/surgical mask placed on the      

      client. Provider contacted for isolation considerations. Ebola Screen: Patient denies       

      travel to an Ebola-affected area in the 21 days before illness onset.                       

16:50 Method Of Arrival: Wheelchair                                                           tw2 

16:54 Initial Sepsis Screen: Does the patient meet any 2 criteria? No. Patient's initial      tw2 

      sepsis screen is negative. Does the patient have a suspected source of infection? No.       

      Patient's initial sepsis screen is negative. Risk Assessment: Do you want to hurt           

      yourself or someone else? Patient reports no desire to harm self or others. Onset of        

      symptoms was September 15, 2022.                                                            

16:54 Acuity: GERMAN 2                                                                           tw2 

16:57 Chief complaint: Spouse and/or significant other states: his urban was changed 9/3.     tw2 

                                                                                                  

Triage Assessment:                                                                                

16:54 General: Appears in no apparent distress. well groomed, Behavior is calm, cooperative,  tw2 

      appropriate for age. General: Reports fatigue for >3 days. Pain: Denies pain.               

      Respiratory: Reports cough that is.                                                         

16:57 : 3-way catheter in place to gravity drainage pt arrives with urban in place. spouse  tw2 

      reports it was changed 9/3/22 and reports an orange cast into it.                           

                                                                                                  

Historical:                                                                                       

- Allergies:                                                                                      

16:54 Codeine;                                                                                tw2 

16:54 Fentanyl;                                                                               tw2 

16:54 Xanax;                                                                                  tw2 

- Home Meds:                                                                                      

16:54 digoxin 125 mcg (0.125 mg) Oral tab 1 tab once daily [Active]; ferrous sulfate 325 mg   tw2 

      (65 mg iron) Oral tab daily [Active]; Insulin: Novolin R Sub-Q [Active]; carvedilol 25      

      mg oral tab 1 tab 2 times per day [Active]; Lantus 100 unit/mL Sub-Q soln 18 unit daily     

      [Active]; losartan 150 mg Oral tab 1 tab once daily [Active]; Multiple Vitamins Oral        

      tab daily [Active]; tamsulosin 0.4 mg Oral cp24 1 cap once daily [Active];                  

      spironolactone 25 mg Oral tab 0.5 tab once daily [Active]; Protonix 40 mg Oral TbEC 1       

      tab 2 times per day [Active]; Victoza 2-Rj subcutaneous once daily [Active]; warfarin      

      3 mg oral tab [Active];                                                                     

- PMHx:                                                                                           

16:54 CAD; Diabetes - IDDM; Hypertension; Hyperlipidemia; CHF;                                tw2 

- PSHx:                                                                                           

16:54 LVAD;                                                                                   tw2 

                                                                                                  

- Immunization history:: Client reports receiving the 2nd dose of the Covid vaccine.              

- Social history:: Smoking status: Patient denies any tobacco usage or history of.                

- Family history:: not pertinent.                                                                 

                                                                                                  

                                                                                                  

Screenin:07 Abuse screen: Denies threats or abuse. Denies injuries from another. Nutritional        ld1 

      screening: No deficits noted. Tuberculosis screening: No symptoms or risk factors           

      identified. Fall Risk None identified.                                                      

                                                                                                  

Assessment:                                                                                       

18:07 General: Appears in no apparent distress. comfortable, Behavior is calm, cooperative,   ld1 

      appropriate for age. Pain: Denies pain. Neuro: Level of Consciousness is awake, alert,      

      obeys commands, Oriented to person, place, time, situation. Cardiovascular: Capillary       

      refill < 3 seconds Patient's skin is warm and dry. Respiratory: Airway is patent            

      Respiratory effort is even, unlabored. GI: Abdomen is flat, non-distended. : No signs     

      and/or symptoms were reported regarding the genitourinary system. EENT: No signs and/or     

      symptoms were reported regarding the EENT system. Derm: No signs and/or symptoms            

      reported regarding the dermatologic system. Musculoskeletal: No signs and/or symptoms       

      reported regarding the musculoskeletal system.                                              

18:30 Reassessment: Patient and/or family updated on plan of care and expected duration. Pain eh3 

      level reassessed. Patient is alert, oriented x 3, equal unlabored respirations, skin        

      warm/dry/pink.                                                                              

19:30 Reassessment: Patient and/or family updated on plan of care and expected duration. Pain eh3 

      level reassessed. Patient is alert, oriented x 3, equal unlabored respirations, skin        

      warm/dry/pink.                                                                              

20:30 Reassessment: Patient and/or family updated on plan of care and expected duration. Pain eh3 

      level reassessed. Patient is alert, oriented x 3, equal unlabored respirations, skin        

      warm/dry/pink.                                                                              

21:30 Reassessment: Patient and/or family updated on plan of care and expected duration. Pain eh3 

      level reassessed. Patient is alert, oriented x 3, equal unlabored respirations, skin        

      warm/dry/pink.                                                                              

22:30 Reassessment: Patient and/or family updated on plan of care and expected duration. Pain eh3 

      level reassessed. Patient is alert, oriented x 3, equal unlabored respirations, skin        

      warm/dry/pink.                                                                              

                                                                                                  

Vital Signs:                                                                                      

16:50  / 68; Pulse 50; Resp 16; Temp 98.4(TE); Pulse Ox 98% on R/A; Weight 68.95 kg     tw2 

      (R); Height 5 ft. 7 in. (170.18 cm); Pain 0/10;                                             

18:07  / 71; Pulse 71; Resp 22; Pulse Ox 100% on R/A;                                   ld1 

18:30  / 79; Pulse 73; Resp 19; Pulse Ox 100% on R/A;                                   eh3 

19:30  / 76; Pulse 74; Resp 18; Pulse Ox 97% on R/A;                                    eh3 

20:30  / 80; Pulse 72; Resp 20; Pulse Ox 99% on R/A;                                    eh3 

21:30  / 83; Pulse 69; Resp 20; Pulse Ox 99% on R/A;                                    eh3 

22:30  / 80; Pulse 67; Resp 22; Pulse Ox 100% on R/A;                                   eh3 

16:50 Body Mass Index 23.81 (68.95 kg, 170.18 cm)                                             tw2 

                                                                                                  

ED Course:                                                                                        

16:41 Patient arrived in ED.                                                                  mr  

16:41 KEREN Horowitz MD is Private Physician.                                                       mr  

16:54 Triage completed.                                                                       tw2 

16:54 Arm band placed on.                                                                     tw2 

17:06 Yaniv Guillermo MD is Attending Physician.                                             [facility] 

17:13 Cate Johnson, BRANT is Primary Nurse.                                                   ld1 

17:50 XRAY Chest (1 view) In Process Unspecified.                                             EDMS

18:06 SARS RAPID Sent.                                                                        ld1 

18:07 Patient has correct armband on for positive identification. Placed in gown. Bed in low  ld1 

      position. Call light in reach. Side rails up X2. Cardiac monitor on. Pulse ox on. NIBP      

      on. Door closed. Noise minimized. Warm blanket given.                                       

18:07 No provider procedures requiring assistance completed. Inserted saline lock: 20 gauge   ld1 

      in right forearm, using aseptic technique. Blood collected.                                 

18:16 Urine Culture Sent.                                                                     kc6 

18:16 Urine Microscopic Only Sent.                                                            kc6 

18:18 SARS RAPID Sent.                                                                        eh3 

20:08 Abdomen In Process Unspecified.                                                         EDMS

23:16 Patient transferred, IV remains in place.                                               3 

                                                                                                  

Administered Medications:                                                                         

18:20 Drug: Meropenem 1 grams Route: IV; Rate: per protocol; Site: right antecubital;         3 

19:44 Follow up: Response: No adverse reaction; IV Intake: 100ml                              3 

19:45 Follow up: Response: No adverse reaction; IV Status: Completed infusion; IV Intake:     eh3 

      100ml                                                                                       

18:20 Drug: NS 0.9% 1000 ml Route: IV; Rate: 125 ml/hr; Site: right antecubital;              3 

23:17 Follow up: IV Status: IV converted to saline lock; IV Intake: 500ml                     3 

18:20 Drug: Pepcid (famotidine) 20 mg Route: IVP; Site: right antecubital;                    3 

19:01 Follow up: Response: No adverse reaction                                                3 

19:44 Drug: Magnesium Sulfate 1 grams Route: IVPB; Infused Over: 1 hrs; Site: right           [facility] 

      antecubital;                                                                                

21:00 Follow up: Response: No adverse reaction; IV Status: Completed infusion; IV Intake:     eh3 

      100ml                                                                                       

                                                                                                  

                                                                                                  

Medication:                                                                                       

18:07 VIS not applicable for this client.                                                     ld1 

                                                                                                  

Intake:                                                                                           

19:44 IV: 100ml; Total: 100ml.                                                                eh3 

19:45 IV: 100ml; Total: 200ml.                                                                eh3 

21:00 IV: 100ml; Total: 300ml.                                                                eh3 

23:17 IV: 500ml; Total: 800ml.                                                                eh3 

                                                                                                  

Output:                                                                                           

23:15 Urine: 1750ml (Urban); Total: 1750ml.                                                   eh3 

                                                                                                  

Outcome:                                                                                          

19:01 ER care complete, transfer ordered by MD. watts 

23:16 Transferred by ground EMS to [facility].                                    eh3 

23:16 Condition: stable                                                                           

23:16 Instructed on the need for transfer.                                                        

23:16 Patient left the ED.                                                                    eh3 

                                                                                                  

Signatures:                                                                                       

Dispatcher [employer]                           EDMS                                                 

Yaniv Guillermo MD MD cha Rivera, Mary                                                                                    

Danielle Olivarez, RN                          RN   tw2                                                  

Cate Johnson RN                     RN   ld1                                                  

Radha Lancaster RN                          RN   eh3                                                  

Lalita Newell                            kc6                                                  

                                                                                                  

Corrections: (The following items were deleted from the chart)                                    

19:00 18:59 Reassessment: Patient and/or family updated on plan of care and expected          eh3 

      duration. Pain level reassessed. Patient is alert, oriented x 3, equal unlabored            

      respirations, skin warm/dry/pink. eh3                                                       

                                                                                                  

**************************************************************************************************

## 2022-09-15 NOTE — EDPHYS
Physician Documentation                                                                           

 [facility]                                                                 

Name: Tu Marie                                                                                 

Age: 72 yrs                                                                                       

Sex: Male                                                                                         

: 1950                                                                                   

MRN: [id number]                                                                                   

Arrival Date: 09/15/2022                                                                          

Time: 16:41                                                                                       

Account#: [id number]                                                                            

Bed 19                                                                                            

Private MD: KEREN Horowitz C                                                                            

ED Physician Yaniv Guillermo                                                                      

HPI:                                                                                              

09/15                                                                                             

18:44 This 72 yrs old  Male presents to ER via Wheelchair with complaints of         stefanie 

      Weakness.                                                                                   

18:44 The patient presents to the emergency department with weakness of the entire body,      stefanie 

      generalized weakness. Onset: The symptoms/episode began/occurred 4 day(s) ago. Context:     

      occurred at home. Associated signs and symptoms: Pertinent positives: weakness.             

      Severity of symptoms: At their worst the symptoms were mild moderate in the emergency       

      department the symptoms are unchanged. Patient's baseline: Neuro: alert and fully           

      oriented. Current symptoms: confusion. The patient has experienced similar episodes in      

      the past, multiple times.                                                                   

                                                                                                  

Historical:                                                                                       

- Allergies:                                                                                      

16:54 Codeine;                                                                                tw2 

16:54 Fentanyl;                                                                               tw2 

16:54 Xanax;                                                                                  tw2 

- Home Meds:                                                                                      

16:54 digoxin 125 mcg (0.125 mg) Oral tab 1 tab once daily [Active]; ferrous sulfate 325 mg   tw2 

      (65 mg iron) Oral tab daily [Active]; Insulin: Novolin R Sub-Q [Active]; carvedilol 25      

      mg oral tab 1 tab 2 times per day [Active]; Lantus 100 unit/mL Sub-Q soln 18 unit daily     

      [Active]; losartan 150 mg Oral tab 1 tab once daily [Active]; Multiple Vitamins Oral        

      tab daily [Active]; tamsulosin 0.4 mg Oral cp24 1 cap once daily [Active];                  

      spironolactone 25 mg Oral tab 0.5 tab once daily [Active]; Protonix 40 mg Oral TbEC 1       

      tab 2 times per day [Active]; Victoza 2-Rj subcutaneous once daily [Active]; warfarin      

      3 mg oral tab [Active];                                                                     

- PMHx:                                                                                           

16:54 CAD; Diabetes - IDDM; Hypertension; Hyperlipidemia; CHF;                                tw2 

- PSHx:                                                                                           

16:54 LVAD;                                                                                   tw2 

                                                                                                  

- Immunization history:: Client reports receiving the 2nd dose of the Covid vaccine.              

- Social history:: Smoking status: Patient denies any tobacco usage or history of.                

- Family history:: not pertinent.                                                                 

                                                                                                  

                                                                                                  

ROS:                                                                                              

18:44 Constitutional: Negative for fever, chills, and weight loss, Eyes: Negative for injury, stefanie 

      pain, redness, and discharge, ENT: Negative for injury, pain, and discharge, Neck:          

      Negative for injury, pain, and swelling, Cardiovascular: Negative for chest pain,           

      palpitations, and edema, Respiratory: Negative for shortness of breath, cough,              

      wheezing, and pleuritic chest pain, Abdomen/GI: Negative for abdominal pain, nausea,        

      vomiting, diarrhea, and constipation, : Negative for injury, bleeding, discharge, and     

      swelling, MS/Extremity: Negative for injury and deformity, Skin: Negative for injury,       

      rash, and discoloration, Psych: Negative for depression, anxiety, suicide ideation,         

      homicidal ideation, and hallucinations, Allergy/Immunology: Negative for hives, rash,       

      and allergies, Endocrine: Negative for neck swelling, polydipsia, polyuria, polyphagia,     

      and marked weight changes, Hematologic/Lymphatic: Negative for swollen nodes, abnormal      

      bleeding, and unusual bruising.                                                             

18:44 Back: Positive for                                                                          

18:44 : Positive for urban.                                                                     

                                                                                                  

Exam:                                                                                             

18:44 Constitutional:  This is a well developed, well nourished patient who is awake, alert,  stefanie 

      and in no acute distress. Head/Face:  Normocephalic, atraumatic. Eyes:  Pupils equal        

      round and reactive to light, extra-ocular motions intact.  Lids and lashes normal.          

      Conjunctiva and sclera are non-icteric and not injected.  Cornea within normal limits.      

      Periorbital areas with no swelling, redness, or edema. ENT:  Nares patent. No nasal         

      discharge, no septal abnormalities noted.  Tympanic membranes are normal and external       

      auditory canals are clear.  Oropharynx with no redness, swelling, or masses, exudates,      

      or evidence of obstruction, uvula midline.  Mucous membranes moist. Neck:  Trachea          

      midline, no thyromegaly or masses palpated, and no cervical lymphadenopathy.  Supple,       

      full range of motion without nuchal rigidity, or vertebral point tenderness.  No            

      Meningismus. Chest/axilla:  Normal chest wall appearance and motion.  Nontender with no     

      deformity.  No lesions are appreciated. Cardiovascular:  Regular rate and rhythm with a     

      normal S1 and S2.  No gallops, murmurs, or rubs.  Normal PMI, no JVD.  No pulse             

      deficits. Respiratory:  Lungs have equal breath sounds bilaterally, clear to                

      auscultation and percussion.  No rales, rhonchi or wheezes noted.  No increased work of     

      breathing, no retractions or nasal flaring. Abdomen/GI:  Soft, non-tender, with normal      

      bowel sounds.  No distension or tympany.  No guarding or rebound.  No evidence of           

      tenderness throughout. Back:  No spinal tenderness.  No costovertebral tenderness.          

      Full range of motion. Skin:  Warm, dry with normal turgor.  Normal color with no            

      rashes, no lesions, and no evidence of cellulitis. MS/ Extremity:  Pulses equal, no         

      cyanosis.  Neurovascular intact.  Full, normal range of motion. Psych:  Awake, alert,       

      with orientation to person, place and time.  Behavior, mood, and affect are within          

      normal limits.                                                                              

18:44 : CVA tenderness, is absent, Male external genitalia: normal, Bladder: is normal,         

      Rectal exam: Sexual behavior: the patient is not sexually active, a urban is noted.         

18:44 Neuro: Orientation: to person, place, time, situation, Mentation: slow to respond,          

      Memory: immediate memory  is impaired, remote memory is impaired, recent memory  is         

      intact, Cerebellar function: unable to test, Motor: moves all fours, Sensation: no          

      obvious gross deficits, appropriate  no acute changes, Gait: not tested. Babinski           

      testing is normal, seizure activity, is not displayed by the patient.                       

19:06 ECG was reviewed by the Attending Physician.                                            stefanie 

                                                                                                  

Vital Signs:                                                                                      

16:50  / 68; Pulse 50; Resp 16; Temp 98.4(TE); Pulse Ox 98% on R/A; Weight 68.95 kg     tw2 

      (R); Height 5 ft. 7 in. (170.18 cm); Pain 0/10;                                             

18:07  / 71; Pulse 71; Resp 22; Pulse Ox 100% on R/A;                                   ld1 

18:30  / 79; Pulse 73; Resp 19; Pulse Ox 100% on R/A;                                   eh3 

19:30  / 76; Pulse 74; Resp 18; Pulse Ox 97% on R/A;                                    eh3 

20:30  / 80; Pulse 72; Resp 20; Pulse Ox 99% on R/A;                                    eh3 

21:30  / 83; Pulse 69; Resp 20; Pulse Ox 99% on R/A;                                    eh3 

22:30  / 80; Pulse 67; Resp 22; Pulse Ox 100% on R/A;                                   eh3 

16:50 Body Mass Index 23.81 (68.95 kg, 170.18 cm)                                             tw2 

                                                                                                  

MDM:                                                                                              

17:06 Patient medically screened.                                                             [facility] 

18:51 Data reviewed: vital signs, nurses notes, lab test result(s), EKG, radiologic studies,  [facility] 

      CT scan, plain films. Data interpreted: Cardiac monitor: rate is 71 beats/min, rhythm       

      is regular, Pulse oximetry: on room air is 100 %. Test interpretation: by ED physician      

      or midlevel provider: ECG, plain radiologic studies. Counseling: I had a detailed           

      discussion with the patient and/or guardian regarding: the historical points, exam          

      findings, and any diagnostic results supporting the discharge/admit diagnosis, lab          

      results, radiology results.                                                                 

                                                                                                  

09/15                                                                                             

17:24 Order name: Basic Metabolic Panel; Complete Time: 18:55                                 [facility] 

09/15                                                                                             

17:24 Order name: CBC with Diff; Complete Time: 18:42                                         [facility] 

09/15                                                                                             

17:24 Order name: LFT's; Complete Time: 18:55                                                 [facility] 

09/15                                                                                             

17:24 Order name: Magnesium; Complete Time: 18:55                                             [facility] 

09/15                                                                                             

17:24 Order name: NT PRO-BNP; Complete Time: 18:55                                            [facility] 

09/15                                                                                             

17:24 Order name: PT-INR; Complete Time: 18:42                                                [facility] 

09/15                                                                                             

17:24 Order name: Troponin HS; Complete Time: 18:55                                           [facility] 

09/15                                                                                             

17:24 Order name: Lipase; Complete Time: 18:55                                                [facility] 

09/15                                                                                             

17:24 Order name: Blood Culture Adult (2)                                                     [facility] 

09/15                                                                                             

17:24 Order name: Lactate; Complete Time: 18:42                                               [facility] 

09/15                                                                                             

17:24 Order name: Urine Culture                                                               [facility] 

09/15                                                                                             

17:24 Order name: Urine Microscopic Only; Complete Time: 18:42                                [facility] 

09/15                                                                                             

17:24 Order name: SARS RAPID; Complete Time: 18:42                                            [facility] 

09/15                                                                                             

17:24 Order name: Digoxin; Complete Time: 18:55                                               [facility] 

09/15                                                                                             

17:24 Order name: XRAY Chest (1 view); Complete Time: 18:42                                   [facility] 

09/15                                                                                             

17:24 Order name: EKG; Complete Time: 17:26                                                   [facility] 

09/15                                                                                             

17:24 Order name: Cardiac monitoring; Complete Time: 17:30                                    [facility] 

09/15                                                                                             

17:24 Order name: EKG - Nurse/Tech; Complete Time: 17:30                                      [facility] 

09/15                                                                                             

17:24 Order name: IV Saline Lock; Complete Time: 18:06                                        [facility] 

09/15                                                                                             

17:24 Order name: Labs collected and sent; Complete Time: 18:06                               [facility] 

09/15                                                                                             

17:24 Order name: O2 Per Protocol; Complete Time: 17:29                                       [facility] 

09/15                                                                                             

17:24 Order name: O2 Sat Monitoring; Complete Time: 17:29                                     [facility] 

09/15                                                                                             

17:24 Order name: Urine Dipstick-Ancillary (obtain specimen); Complete Time: 18:16            [facility] 

09/15                                                                                             

18:15 Order name: Urine Dipstick-Ancillary; Complete Time: 18:42                              [facility]

09/15                                                                                             

18:43 Order name: CT Abd/Pelvis - Without Contrast                                            [facility] 

09/15                                                                                             

18:48 Order name: Abdomen                                                                     [facility]

                                                                                                  

EC:06 Rate is 78 beats/min. Rhythm is regular. QRS Axis is Normal. [place] interval is normal. QRS stefanie 

      interval is prolonged at 180 msec. QT interval is normal. No Q waves. T waves are           

      Normal. No ST changes noted. Clinical impression: Abnormal EKG without significant          

      change. Interpreted by me. Reviewed by me.                                                  

                                                                                                  

Administered Medications:                                                                         

18:20 Drug: Meropenem 1 grams Route: IV; Rate: per protocol; Site: right antecubital;         [facility] 

19:44 Follow up: Response: No adverse reaction; IV Intake: 100ml                              [facility] 

19:45 Follow up: Response: No adverse reaction; IV Status: Completed infusion; IV Intake:     eh3 

      100ml                                                                                       

18:20 Drug: NS 0.9% 1000 ml Route: IV; Rate: 125 ml/hr; Site: right antecubital;              [facility] 

23:17 Follow up: IV Status: IV converted to saline lock; IV Intake: 500ml                     [facility] 

18:20 Drug: Pepcid (famotidine) 20 mg Route: IVP; Site: right antecubital;                    [facility] 

19:01 Follow up: Response: No adverse reaction                                                [facility] 

19:44 Drug: Magnesium Sulfate 1 grams Route: IVPB; Infused Over: 1 hrs; Site: right           [facility] 

      antecubital;                                                                                

21:00 Follow up: Response: No adverse reaction; IV Status: Completed infusion; IV Intake:     eh3 

      100ml                                                                                       

                                                                                                  

                                                                                                  

Disposition Summary:                                                                              

09/15/22 19:01                                                                                    

Transfer Ordered                                                                                  

      Transfer Location: [facility]                                                     stefanie 

      Reason: Higher level of care                                                            stefanie 

      Condition: Fair                                                                         stefanie 

      Problem: an acute exacerbation                                                          stefanie 

      Symptoms: have improved                                                                 stefanie 

      Accepting Physician: [facility] , heart failure team(09/15/22 23:16)                  3 

      Diagnosis                                                                                   

        - Weakness                                                                            stefanie 

        - Left ventricular failure - with LVAD                                                stefanie 

        - Anemia, unspecified                                                                 stefanie 

        - Hypomagnesemia                                                                      stefanie 

        - UTI/ Urinary tract infection, site not specified                                    stefanie 

        - Type 1 diabetes mellitus with hyperglycemia                                         stefanie 

        - Long term (current) use of anticoagulants                                           stefanie 

      Forms:                                                                                      

        - Medication Reconciliation Form                                                      stefanie 

        - SBAR form                                                                           stefanie 

Signatures:                                                                                       

Dispatcher [employer]                           EDYaniv Tejeda MD MD cha Wise, Tara, RN                          RN   tw2                                                  

Rahda Lancaster RN                          RN   eh3                                                  

                                                                                                  

Corrections: (The following items were deleted from the chart)                                    

19:02 19:01 [facility] , heart failure team stefanie                                             stefanie 

19:30 19:02 to [facility] , heart failure team stefanie                                             stefanie 

19:32 19:30 to [facility] , heart failure team stefanie                                             stefanie 

23:16 19:32 [facility] , heart failure team stefanie                                             3 

                                                                                                  

**************************************************************************************************

## 2022-09-15 NOTE — RAD REPORT
EXAM DESCRIPTION:  CT - Abdomen   Pelvis Wo Contrast - 9/15/2022 8:06 pm

 

CLINICAL HISTORY:  Abdominal pain.

Pyelonephritis, uncomplicated

 

COMPARISON:  [facility] Protocol dated 7/11/2022

 

TECHNIQUE:  CT imaging of the abdomen and pelvis was performed without contrast. Solid organ, bowel a
nd vascular assessment is limited due to lack of IV and oral contrast.

 

All CT scans are performed using dose optimization technique as appropriate and may include automated
 exposure control or mA/KV adjustment according to patient size.

 

FINDINGS:  Mild atelectasis is seen in both posterior lung bases.Left ventricular assist device is pr
esent.

 

The liver, spleen, pancreas, adrenal glands and kidneys are within normal limits for a limited non-co
ntrast examination.Cholelithiasis. Benign renal cysts bilaterally.

 

No bowel obstruction, free air, free fluid or abscess. Moderate stool is present throughout the colon
. The appendix is normal.

 

The osseous structures are within normal limits.Significant enlargement of prostate gland appears uri
nary catheter is in place.

 

IMPRESSION:  No acute intra-abdominal or pelvic findings.

 

Cholelithiasis.

 

Significant prostatomegaly.

 

A limited non-contrast examination was performed as detailed.

## 2022-09-16 NOTE — EKG
Test Date:    2022-09-15               Test Time:    17:33:38

Technician:   KHUSHI                                   

                                                     

MEASUREMENT RESULTS:                                       

Intervals:                                           

Rate:         71                                     

NE:                                                  

QRSD:         138                                    

QT:           384                                    

QTc:          417                                    

Axis:                                                

P:                                                   

NE:                                                  

QRS:          -62                                    

T:            90                                     

                                                     

INTERPRETIVE STATEMENTS:                                       

                                                     

paced rhythm rhythm with frequent and consecutive premature ventricular

complexes

Left axis deviation

Right bundle branch block

Possible Lateral infarct, age undetermined

Inferior infarct, age undetermined

Abnormal ECG

Compared to ECG 06/07/2022 22:12:23

Right bundle-branch block now present

Atrial premature complex(es) no longer present

Myocardial infarct finding still present

[email or web address]('[email or web address]');

Electronically Signed On 09-16-22 12:31:45 CDT by Kelby Alarcon

## 2022-09-17 VITALS — SYSTOLIC BLOOD PRESSURE: 116 MMHG | OXYGEN SATURATION: 100 % | DIASTOLIC BLOOD PRESSURE: 80 MMHG

## 2022-09-17 VITALS — TEMPERATURE: 98.4 F

## 2022-11-07 ENCOUNTER — HOSPITAL ENCOUNTER (EMERGENCY)
Dept: HOSPITAL 97 - ER | Age: 72
Discharge: HOME | End: 2022-11-07
Payer: COMMERCIAL

## 2022-11-07 VITALS — SYSTOLIC BLOOD PRESSURE: 119 MMHG | OXYGEN SATURATION: 99 % | DIASTOLIC BLOOD PRESSURE: 89 MMHG

## 2022-11-07 VITALS — TEMPERATURE: 97.9 F

## 2022-11-07 DIAGNOSIS — Z20.822: ICD-10-CM

## 2022-11-07 DIAGNOSIS — G45.9: Primary | ICD-10-CM

## 2022-11-07 DIAGNOSIS — Z88.5: ICD-10-CM

## 2022-11-07 DIAGNOSIS — Z79.01: ICD-10-CM

## 2022-11-07 DIAGNOSIS — I10: ICD-10-CM

## 2022-11-07 DIAGNOSIS — E11.9: ICD-10-CM

## 2022-11-07 DIAGNOSIS — I50.9: ICD-10-CM

## 2022-11-07 DIAGNOSIS — Z79.4: ICD-10-CM

## 2022-11-07 LAB
BUN BLD-MCNC: 27 MG/DL (ref 7–18)
GLUCOSE SERPLBLD-MCNC: 209 MG/DL (ref 74–106)
HCT VFR BLD CALC: 31.8 % (ref 39.6–49)
INR BLD: 1.59
LYMPHOCYTES # SPEC AUTO: 1 K/UL (ref 0.7–4.9)
MCV RBC: 84.7 FL (ref 80–100)
PMV BLD: 7 FL (ref 7.6–11.3)
POTASSIUM SERPL-SCNC: 4.5 MMOL/L (ref 3.5–5.1)
RBC # BLD: 3.76 M/UL (ref 4.33–5.43)
TROPONIN I SERPL HS-MCNC: 27.8 PG/ML (ref ?–58.9)

## 2022-11-07 PROCEDURE — 70450 CT HEAD/BRAIN W/O DYE: CPT

## 2022-11-07 PROCEDURE — 85610 PROTHROMBIN TIME: CPT

## 2022-11-07 PROCEDURE — 80048 BASIC METABOLIC PNL TOTAL CA: CPT

## 2022-11-07 PROCEDURE — 71045 X-RAY EXAM CHEST 1 VIEW: CPT

## 2022-11-07 PROCEDURE — 99284 EMERGENCY DEPT VISIT MOD MDM: CPT

## 2022-11-07 PROCEDURE — 85025 COMPLETE CBC W/AUTO DIFF WBC: CPT

## 2022-11-07 PROCEDURE — 82947 ASSAY GLUCOSE BLOOD QUANT: CPT

## 2022-11-07 PROCEDURE — 84484 ASSAY OF TROPONIN QUANT: CPT

## 2022-11-07 PROCEDURE — 36415 COLL VENOUS BLD VENIPUNCTURE: CPT

## 2022-11-07 PROCEDURE — 87811 SARS-COV-2 COVID19 W/OPTIC: CPT

## 2022-11-07 PROCEDURE — 93005 ELECTROCARDIOGRAM TRACING: CPT

## 2022-11-07 PROCEDURE — 83735 ASSAY OF MAGNESIUM: CPT

## 2022-11-07 PROCEDURE — 85730 THROMBOPLASTIN TIME PARTIAL: CPT

## 2022-11-07 PROCEDURE — 72125 CT NECK SPINE W/O DYE: CPT

## 2022-11-07 NOTE — RAD REPORT
EXAM DESCRIPTION:  CT - [id number] - 11/7/2022 5:01 pm

 

CLINICAL HISTORY:  slurred speech, CVA, fall

 

COMPARISON:  Head C Spine Mpr Wo Con dated 8/16/2022

 

TECHNIQUE:  Axial 5 mm thick images of the head were obtained.  Axial 2 mm thick images of the cervic
al spine were obtained with sagittal and coronal reconstruction images generated and reviewed.

 

All CT scans are performed using dose optimization technique as appropriate and may include automated
 exposure control or mA/KV adjustment according to patient size.

 

FINDINGS:  No intracranial hemorrhage, mass, edema or acute intracranial finding. No acute cortical b
ased infarction. Advanced for age atrophy changes are present with ventricles in proportion. Prominen
t chronic ischemic changes are present as well. 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
al lobe a moderate-sized area of old infarction in the posteromedial left occipital lobe. No extra-ax
ial fluid collections. Intracranial findings are similar to August 16 imaging.

 

Mastoid air cells are clear. Chronic sphenoid right-sided sinusitis. Remaining paranasal sinuses are 
clear. No globe or orbit abnormality seen.

 

Cervical body height and alignment are normal. C5-6 and C6-7 disc space narrowing present. Scattered 
facet joint degenerative changes are present. Multilevel bony foraminal encroachment is present. No f
racture or acute bony abnormality. No pathologic bone process. Central canal detail is inherently bustamante
ited.

 

No paraspinal mass or hematoma. Dense arterial calcifications are present.

 

Images were initially available only in the exception folder which precludes comparison to any prior 
imaging and precludes dictation of the report. Images were reviewed in this setting with findings tel
ephoned to the [facility] in emergency department 1657 hours.

 

IMPRESSION:  Advanced atrophy and chronic ischemic change similar to August 16 imaging. No acute intr
acranial finding.

 

Cervical spine degenerative change similar to August 16 imaging. No acute finding.

## 2022-11-07 NOTE — RAD REPORT
EXAM DESCRIPTION:  RAD - Chest Single View - 11/7/2022 5:05 pm

 

CLINICAL HISTORY:  slurred speech, Stroke protocol chest film

 

COMPARISON:  Portable 09/15/2022

 

TECHNIQUE:  AP portable chest image was obtained 11/7/2022 5:05 pm .

 

FINDINGS:  Lung volumes are low accentuating the patient's baseline interstitial fibrotic change. Lef
t hemidiaphragm elevation is present. No significant failure or volume overload finding seen. An acut
e infiltrate is not seen.

 

 Heart size is upper normal similar to comparison. Left subclavian pacemaker is in place. LVAD is sti
ll in place.

 

 No measurable pleural effusion and no pneumothorax. No acute bony abnormality seen. No acute aortic 
findings suspected.

 

IMPRESSION:  Limited portable study without acute cardiopulmonary finding.

 

No significant change from prior imaging.

## 2022-11-07 NOTE — EDPHYS
Physician Documentation                                                                           

 [facility]                                                                 

Name: Tu Marie                                                                                 

Age: 72 yrs                                                                                       

Sex: Male                                                                                         

: 1950                                                                                   

MRN: [id number]                                                                                   

Arrival Date: 2022                                                                          

Time: 16:47                                                                                       

Account#: [id number]                                                                            

Bed 7                                                                                             

Private MD: KEREN Horowitz C                                                                            

ED Physician Jigar Flower                                                                         

HPI:                                                                                              

                                                                                             

16:54 This 72 yrs old Male presents to ER via [facility] with complaints of slurred speech.   rn  

16:54 The patient presents to the emergency department with a speech or higher order brain    rn  

      function problem, slurred speech. Onset: The symptoms/episode began/occurred at 16:00.      

      Associated signs and symptoms: Pertinent positives: This patient does not have any          

      pertinent positives. Pertinent negatives: altered mental status, fever, neck stiffness,     

      seizure, syncope, double vision, visual field changes, loss of vision, weakness.            

      Severity of symptoms: At their worst the symptoms were moderate in the emergency            

      department the symptoms are unchanged. Current symptoms: slurred speech. The patient        

      has experienced a previous episode. The patient has not recently seen a physician. EMS      

      reports wife called 911, noticed a changed at 1600 of slurred speech. EMS had gone out      

      to house earlier for generalized weakness and lift assist, did not have slurred speech      

      at that time. Pt confirms acute onset and just happened. NO trauma. Has LVAD and taking     

      coumadin. NO active bleeding at this time. Has had TIA in past with slurred speech. .       

                                                                                                  

Historical:                                                                                       

- Allergies:                                                                                      

16:43 Codeine;                                                                                vg1 

16:43 Fentanyl;                                                                               vg1 

16:43 Xanax;                                                                                  vg1 

- Home Meds:                                                                                      

16:43 digoxin 125 mcg (0.125 mg) Oral tab 1 tab once daily [Active]; Insulin: Novolin R Sub-Q vg1 

      [Active]; Protonix 40 mg Oral TbEC 1 tab 2 times per day [Active]; spironolactone 25 mg     

      Oral tab 0.5 tab once daily [Active]; tamsulosin 0.4 mg Oral cp24 1 cap once daily          

      [Active]; warfarin 3 mg Oral tab [Active]; rosuvastatin oral [Active]; Digoxin Oral         

      [Active];                                                                                   

- PMHx:                                                                                           

16:43 CAD; CHF; Diabetes - IDDM; encephalomelacia; Hyperlipidemia; Hypertension;              vg1 

- PSHx:                                                                                           

16:43 LVAD;                                                                                   vg1 

                                                                                                  

- Immunization history:: Client reports receiving the 2nd dose of the Covid vaccine.              

- Social history:: Smoking status: Patient denies any tobacco usage or history of.                

- Family history:: not pertinent.                                                                 

- Hospitalizations: : No recent hospitalization is reported.                                      

                                                                                                  

                                                                                                  

ROS:                                                                                              

16:54 Constitutional: Negative for fever, chills, and weight loss, Eyes: Negative for injury, rn  

      pain, redness, and discharge, Neck: Negative for injury, pain, and swelling,                

      Cardiovascular: Negative for chest pain, palpitations, and edema, Respiratory: Negative     

      for shortness of breath, cough, wheezing, and pleuritic chest pain, Abdomen/GI:             

      Negative for abdominal pain, nausea, vomiting, diarrhea, and constipation, Back:            

      Negative for injury and pain, MS/Extremity: Negative for injury and deformity, Skin:        

      Negative for injury, rash, and discoloration, Neuro: Negative for headache, weakness,       

      numbness, tingling, and seizure.                                                            

                                                                                                  

Exam:                                                                                             

16:54 Constitutional:  This is a well developed, well nourished patient who is awake, alert,  rn  

      and in no acute distress. Head/Face:  Normocephalic, atraumatic. Eyes:  Periorbital         

      areas with no swelling, redness, or edema. Cardiovascular:  No cyanosis Respiratory:        

      No increased work of breathing, no retractions or nasal flaring. Abdomen/GI:  Soft,         

      non-tender, LVAD present Skin:  Warm, dry MS/ Extremity:  No cyanosis.  Neuro:  Awake       

      and alert, GCS 15, oriented to person, place, time, and situation.  Slurred speech, no      

      facial weakness.  EOMI.  Motor strength 5/5 in all extremities.  Sensory grossly            

      intact.  Cerebellar exam normal.                                                            

17:25 ECG was reviewed by the Attending Physician.                                            rn  

                                                                                                  

Vital Signs:                                                                                      

16:43  / 78; Pulse 56; Resp 19; Temp 97.9; Pulse Ox 99% ; Weight 68.49 kg; Height 5 ft. vg1 

      7 in. (170.18 cm); Pain 0/10;                                                               

18:00  / 83; Pulse 67; Resp 21; Pulse Ox 97% on R/A;                                    vg1 

18:30  / 89; Pulse 69; Resp 21; Pulse Ox 99% on R/A;                                    vg1 

16:43 Body Mass Index 23.65 (68.49 kg, 170.18 cm)                                             vg1 

                                                                                                  

NIH Stroke Scale Scores:                                                                          

16:43 NIHSS Score: 1                                                                          vg1 

17:21 NIHSS Score: 1                                                                          rn  

17:22 NIHSS Score: 0                                                                          vg1 

                                                                                                  

MDM:                                                                                              

16:48 Patient medically screened.                                                             rn  

16:59 ED course: CT head without acute findings per radiology. .                              rn  

17:05 ED course: Consulted with Dr. Wyatt, would be TNKase candidate as long as INR < 1.7. rn  

      Awaiting lab value, last 2 INR in ER < 1.7. .                                               

17:21 ED course: Initially NIH 1, for speech, mild, but now completely back to normal, wife   rn  

      here to confirm, patient also states back to normal. Will not give TNKase given that        

      patient with complete resolution of symptoms. Wife and patient agree. They request          

      transfer to [facility] where his heart doctor and rest of team are. .                        

17:47 Data reviewed: vital signs, nurses notes, lab test result(s), EKG, radiologic studies,  rn  

      CT scan, and as a result, I will admit patient. Counseling: I had a detailed discussion     

      with the patient and/or guardian regarding: the historical points, exam findings, and       

      any diagnostic results supporting the discharge/admit diagnosis, lab results, radiology     

      results, the need for further work-up and treatment in the hospital. Response to            

      treatment: the patient's symptoms have resolved after treatment, the patient's              

      condition has returned to base line, and as a result, I will admit patient. ED course:      

      [facility] at saturation, even for ER, and also unable tto take patient to [facility].       

      Will have to admit here. .                                                                  

18:12 ED course: Consulted with Dr. Reyna, recommended transfer to [facility] because of LVAD,  rn  

      but patient and wife refuse transfer, they are only ok staying here or transfer to          

      [facility]. Spoke with Dr. Wyatt, ok to keep here, will watch overnight, unable to        

      get MRI, already on statin, on coumadin, just not therapeutic. .                            

18:56 ED course: Pt is Dr. Horowitz patient, Dr. Horowitz contacted, requests that we speak with      rn  

      cardiology prior to admission, [facility] system has been contacted and no beds              

      available at more than one institution. .                                                   

19:14 ED course: Long discussion with wife regarding situation currently, was initially       rn  

      accepted for admission by hospitalist group, then Dr. Horowitz identified as PCP, Dr. Horowitz      

      does not want patient admitted here because of LVAD, even though no problem with LVAD.      

      Spoke with patient and wife, still do not want to be transferred to another facility.       

      Even offered ER observation since his PCP will not accept admission to this hospital,       

      and they refuse. Wife wants to take patient home since back to normal. This is              

      acceptable given patient's symptoms have completely resolved now, already on coumadin       

      and has been told by his cardiologist due to bleeding reasons his strict INR goal is        

      1.5-1.8 which he is at goal currently. Already on statin. Can't get MRI 2/2 LVAD. Wife      

      and patient understand risks of not being transferred/admitted/observed, and both want      

      to go home. Wife and patient thankful and appreciative. .                                   

19:19 ED course: Contacted transplant coordinator, she contacted ER and transfer center, also rn  

      unable to make a bed open. .                                                                

                                                                                                  

                                                                                             

16:48 Order name: Basic Metabolic Panel; Complete Time: 17:36                                 rn  

                                                                                             

16:48 Order name: CBC with Diff; Complete Time: 17:36                                         rn  

                                                                                             

16:48 Order name: High Sensitivity Troponin; Complete Time: 17:36                             rn  

                                                                                             

16:48 Order name: Protime (+inr); Complete Time: 17:21                                        rn  

                                                                                             

16:48 Order name: Ptt, Activated; Complete Time: 17:21                                        rn  

                                                                                             

16:48 Order name: SARS RAPID; Complete Time: 17:27                                            rn  

                                                                                             

16:48 Order name: Stroke CXR 1 View; Complete Time: 17:21                                     rn  

                                                                                             

16:48 Order name: EKG; Complete Time: 16:50                                                   rn  

                                                                                             

16:48 Order name: Accucheck; Complete Time: 17:07                                             rn  

                                                                                             

16:56 Order name: Head C Spine Mpr Wo Con; Complete Time: 17:13                               EDMS

                                                                                             

17:09 Order name: Glucose, Ancillary Testing; Complete Time: 17:12                            EDMS

                                                                                             

17:40 Order name: Magnesium; Complete Time: 18:08                                             vg1 

                                                                                             

16:48 Order name: Cardiac monitoring; Complete Time: 17:07                                    rn  

                                                                                             

16:48 Order name: EKG - Nurse/Tech; Complete Time: 17:07                                      rn  

                                                                                             

16:48 Order name: IV Saline Lock; Complete Time: 17:07                                        rn  

                                                                                             

16:48 Order name: Labs collected and sent; Complete Time: 17:                               rn  

                                                                                             

16:48 Order name: NPO; Complete Time: 17:                                                   rn  

                                                                                             

16:48 Order name: O2 Per Protocol; Complete Time: 17:                                       rn  

                                                                                             

16:48 Order name: O2 Sat Monitoring; Complete Time: 17:                                     rn  

                                                                                             

16:48 Order name: Stroke Swallow Screen; Complete Time: 17:                                 rn  

                                                                                                  

EC:25 Rate is 63 beats/min. Rhythm is regular. Left axis deviation noted. QRS is positive in  rn  

      lead I and negative in lead aVF. QRS interval is prolonged at 146 msec. QT interval is      

      normal. No Q waves. T waves are Normal. No ST changes noted. Clinical impression: Paced     

      rhythm, LAD. Interpreted by me. Reviewed by me.                                             

                                                                                                  

Administered Medications:                                                                         

17:40 CANCELLED (Inappropriate at this time): Magnesium 400 mg PO once                        vg1 

                                                                                                  

                                                                                                  

Point of Care Testing:                                                                            

      Blood Glucose:                                                                              

17:00 Blood Glucose: 197 mg/dL;                                                               vg1 

      Ranges:                                                                                     

      Critical Glucose Levels:Adult <50 mg/dl or >400 mg/dl  <40 mg/dl or >180 mg/dl       

Disposition Summary:                                                                              

22 19:14                                                                                    

Discharge Ordered                                                                                 

      Location: Home(22 19:14)                                                          rn  

      Problem: new(22 19:14)                                                            rn  

      Symptoms: are resolved(22 19:14)                                                  rn  

      Condition: Stable(22 19:14)                                                       rn  

      Diagnosis                                                                                   

        - Transient cerebral ischemic attack, unspecified(22 19:14)                     rn  

        - Slurred speech(22 19:14)                                                      rn  

      Followup:                                                                               rn  

        - With: KEREN Horowitz MD                                                                        

        - When: 1 - 2 days                                                                         

        - Reason: Recheck today's complaints, Re-evaluation by your physician                      

      Discharge Instructions:                                                                     

        - Discharge Summary Sheet                                                             rn  

        - Stroke Prevention                                                                   rn  

        - Transient Ischemic Attack                                                           rn  

      Forms:                                                                                      

        - Medication Reconciliation Form                                                      rn  

        - Thank You Letter                                                                    rn  

        - Antibiotic Education                                                                rn  

        - Prescription Opioid Use                                                             rn  

                                                                                                  

NIH Stroke Scale - NIH Stroke Score                                                               

Date: 2022                                                                                  

Time: 16:43                                                                                       

Total Score = 1                                                                                   

  1a. Level of Consciousness (LOC) - 0(Alert)                                                     

  1b. Level of Consciousness (LOC) (Month \T\ Age) - 0(Both)                                      

  1c. LOC Commands (Open \T\ Closes Eyes/) - 0(Both)                                          

   2. Best Gaze (Lateral Gaze Paresis) - 0(Normal)                                                

   3. Visual Field Loss - 0(No visual loss)                                                       

   4. Facial Palsy - 0(Normal)                                                                    

  5a. Left Arm: Motor (10-second hold) - 0(No drift)                                              

  5b. Right Arm: Motor (10-second hold) - 0(No drift)                                             

  6a. Left Leg: Motor (5-second hold - always test supine) - 0(No drift)                          

  6b. Right Leg: Motor (5-second hold - always test supine) - 0(No drift)                         

   7. Limb Ataxia (finger/nose \T\ heel/shin - test with eyes open) - 0(Absent)                   

   8. Sensory Loss (pinprick arms/legs/face) - 0(Normal)                                          

   9. Best Language: Aphasia (description/naming/reading) - 1(Mild to moderate                    

      aphasia)                                                                                    

  10. Dysarthria (speech clarity - read or repeat words) - 0(Normal)                              

  11. Extinction and Inattention (visual/tactile/auditory/spatial/personal) - 0(No                

      abnormality)                                                                                

Initials: vg1                                                                                     

                                                                                                  

                                                                                                  

NIH Stroke Scale - NIH Stroke Score                                                               

Date: 2022                                                                                  

Time: 17:21                                                                                       

Total Score = 1                                                                                   

  1a. Level of Consciousness (LOC) - 0(Alert)                                                     

  1b. Level of Consciousness (LOC) (Month \T\ Age) - 0(Both)                                      

  1c. LOC Commands (Open \T\ Closes Eyes/) - 0(Both)                                          

   2. Best Gaze (Lateral Gaze Paresis) - 0(Normal)                                                

   3. Visual Field Loss - 0(No visual loss)                                                       

   4. Facial Palsy - 0(Normal)                                                                    

  5a. Left Arm: Motor (10-second hold) - 0(No drift)                                              

  5b. Right Arm: Motor (10-second hold) - 0(No drift)                                             

  6a. Left Leg: Motor (5-second hold - always test supine) - 0(No drift)                          

  6b. Right Leg: Motor (5-second hold - always test supine) - 0(No drift)                         

   7. Limb Ataxia (finger/nose \T\ heel/shin - test with eyes open) - 0(Absent)                   

   8. Sensory Loss (pinprick arms/legs/face) - 0(Normal)                                          

   9. Best Language: Aphasia (description/naming/reading) - 0(No aphasia)                         

  10. Dysarthria (speech clarity - read or repeat words) - 1(Mild to Moderate)                    

  11. Extinction and Inattention (visual/tactile/auditory/spatial/personal) - 0(No                

      abnormality)                                                                                

Initials: rn                                                                                      

                                                                                                  

                                                                                                  

NIH Stroke Scale - NIH Stroke Score                                                               

Date: 2022                                                                                  

Time: 17:22                                                                                       

Total Score = 0                                                                                   

  1a. Level of Consciousness (LOC) - 0(Alert)                                                     

  1b. Level of Consciousness (LOC) (Month \T\ Age) - 0(Both)                                      

  1c. LOC Commands (Open \T\ Closes Eyes/) - 0(Both)                                          

   2. Best Gaze (Lateral Gaze Paresis) - 0(Normal)                                                

   3. Visual Field Loss - 0(No visual loss)                                                       

   4. Facial Palsy - 0(Normal)                                                                    

  5a. Left Arm: Motor (10-second hold) - 0(No drift)                                              

  5b. Right Arm: Motor (10-second hold) - 0(No drift)                                             

  6a. Left Leg: Motor (5-second hold - always test supine) - 0(No drift)                          

  6b. Right Leg: Motor (5-second hold - always test supine) - 0(No drift)                         

   7. Limb Ataxia (finger/nose \T\ heel/shin - test with eyes open) - 0(Absent)                   

   8. Sensory Loss (pinprick arms/legs/face) - 0(Normal)                                          

   9. Best Language: Aphasia (description/naming/reading) - 0(No aphasia)                         

  10. Dysarthria (speech clarity - read or repeat words) - 0(Normal)                              

  11. Extinction and Inattention (visual/tactile/auditory/spatial/personal) - 0(No                

      abnormality)                                                                                

Initials: vg1                                                                                     

                                                                                                  

Signatures:                                                                                       

Dispatcher [employer]                           EDMS                                                 

Jigar Flower MD MD rn Garcia, Victoria RN                    RN   vg1                                                  

                                                                                                  

Corrections: (The following items were deleted from the chart)                                    

16:56 16:52 CT-STROKE BRAIN W/O CONTRAST+CT.RAD.BRZ ordered. EDMS                     EDMS        

17:12 16:43 PMHx: LVAD; vg1                                                           vg1         

17:40 17:40 Magnesium 400 mg PO once ordered. vg1                                     vg1         

:48 17:23  rn                                                                    rn          

: 17:23 [employer] System rn                                                       rn          

: 17:23 Higher level of care rn                                                   rn          

: 17:23 Stable rn                                                                 rn          

 17:23 new rn                                                                    rn          

17:48 17:23 are resolved rn                                                           rn          

17:48 17:23 Transient cerebral ischemic attack, unspecified rn                        rn          

17:48 17:23 Slurred speech rn                                                         rn          

19:14 17:50 Observation rn                                                            rn          

: 17:50 Jeremy Reyna rn                                                          rn          

19:14 17:50 Telemetry/MedSurg (observation) rn                                        rn          

: 17:50 Stable rn                                                                 rn          

: 17:50 new rn                                                                    rn          

: 17:50 have improved rn                                                          rn          

: 17:50 Standard rn                                                               rn          

: 17:50 rn                                                                        rn          

:14 17:50 Transient cerebral ischemic attack, unspecified rn                        rn          

: 17:50 Slurred speech rn                                                         rn          

                                                                                                  

**************************************************************************************************

## 2022-11-07 NOTE — ER
Nurse's Notes                                                                                     

 [facility]                                                                 

Name: Tu Marie                                                                                 

Age: 72 yrs                                                                                       

Sex: Male                                                                                         

: 1950                                                                                   

MRN: [id number]                                                                                   

Arrival Date: 2022                                                                          

Time: 16:47                                                                                       

Account#: [id number]                                                                            

Bed 7                                                                                             

Private MD: KEREN Horowitz C                                                                            

Diagnosis: Transient cerebral ischemic attack, unspecified;Slurred speech                         

                                                                                                  

Presentation:                                                                                     

                                                                                             

16:43 Chief complaint: EMS states: Called out this morning for lift assistance and pt 'seemed vg1 

      to be normal'. Toned out for slurred speech, confusion and weakness according to wife       

      that began today at 1600. Pt was recently dx with UTI and has a 16 F urban placed as        

      well as a LVAD to Q.                                                                      

16:43 Coronavirus screen: Vaccine status: Patient reports receiving the 2nd dose of the covid vg1 

      vaccine. Client denies travel out of the [place] in the last 14 days. Ebola Screen:            

      Patient negative for fever greater than or equal to 101.5 degrees Fahrenheit, and           

      additional compatible Ebola Virus Disease symptoms Patient denies exposure to               

      infectious person. An acute neurological deficit is present. The charge nurse has been      

      notified. The patients blood glucose was checked before arriving to the hospital and        

      was found to be normal. Initial Sepsis Screen: Does the patient meet any 2 criteria?        

      No. Patient's initial sepsis screen is negative. Does the patient have a suspected          

      source of infection? No. Patient's initial sepsis screen is negative. Risk Assessment:      

      Do you want to hurt yourself or someone else? Patient reports no desire to harm self or     

      others. Onset of symptoms was 2022 at 16:00.                                   

16:43 Method Of Arrival: EMS: [facility]                                                1 

16:43 Acuity: GERMAN 2                                                                           vg1 

16:43 Care prior to arrival: IV initiated. 20 GA, in the left forearm, Glucose check: 124.    vg1 

                                                                                                  

Triage Assessment:                                                                                

16:43 The onset of the patients symptoms was less than three hours ago. General: Appears in   vg1 

      no apparent distress. uncomfortable, Behavior is calm, cooperative. Pain: Denies pain.      

      EENT: No deficits noted. Neuro: Level of Consciousness is awake, alert, obeys commands,     

      Oriented to person, place, time, situation,  are equal bilaterally Moves all           

      extremities. Gait is unsteady, Speech is slurred, Facial symmetry appears normal.           

      Neuro: Reports denies weakness, headache, dizziness, blurred vision.. Cardiovascular:       

      Patient's skin is warm and dry. Respiratory: Airway is patent Respiratory effort is         

      even, unlabored. GI: Abdomen is flat, non-distended. : Urban in place to gravity          

      drainage. Derm: Skin is pink, warm \T\ dry. Musculoskeletal: Circulation, motion, and       

      sensation intact.                                                                           

16:43 The onset of the patients symptoms was 2022 at 16:00.                      vg1 

                                                                                                  

Stroke Activation: Symptom onset < 3 hours                                                        

 Physician: Stroke Attending; Name: ; Notified At: ; Arrived At:                                  

 Physician: Chief Stroke Resident; Name: ; Notified At: ; Arrived At:                             

 Physician: Stroke Resident; Name: ; Notified At: ; Arrived At:                                   

 Physician: ED Attending; Name: ; Notified At: ; Arrived At:                                      

 Physician: ED Resident; Name: ; Notified At: ; Arrived At:                                       

                                                                                                  

Historical:                                                                                       

- Allergies:                                                                                      

16:43 Codeine;                                                                                vg1 

16:43 Fentanyl;                                                                               vg1 

16:43 Xanax;                                                                                  vg1 

- Home Meds:                                                                                      

16:43 digoxin 125 mcg (0.125 mg) Oral tab 1 tab once daily [Active]; Insulin: Novolin R Sub-Q vg1 

      [Active]; Protonix 40 mg Oral TbEC 1 tab 2 times per day [Active]; spironolactone 25 mg     

      Oral tab 0.5 tab once daily [Active]; tamsulosin 0.4 mg Oral cp24 1 cap once daily          

      [Active]; warfarin 3 mg Oral tab [Active]; rosuvastatin oral [Active]; Digoxin Oral         

      [Active];                                                                                   

- PMHx:                                                                                           

16:43 CAD; CHF; Diabetes - IDDM; encephalomelacia; Hyperlipidemia; Hypertension;              vg1 

- PSHx:                                                                                           

16:43 LVAD;                                                                                   vg1 

                                                                                                  

- Immunization history:: Client reports receiving the 2nd dose of the Covid vaccine.              

- Social history:: Smoking status: Patient denies any tobacco usage or history of.                

- Family history:: not pertinent.                                                                 

- Hospitalizations: : No recent hospitalization is reported.                                      

                                                                                                  

                                                                                                  

Screenin:00 Abuse screen: Denies threats or abuse. Nutritional screening: No deficits noted.        vg1 

      Tuberculosis screening: No symptoms or risk factors identified. Fall Risk Fall in past      

      12 months (25 points). Secondary diagnosis (15 points) TIA, IV access (20 points).          

      Ambulatory Aid- Crutches/Cane/Walker (15 pts). Gait- Weak (10 pts.). Mental Status-         

      Oriented to own ability (0 pts). Total Pichardo Fall Scale indicates High Risk Score (45       

      or more points). Fall prevention measures have been instituted. Side Rails Up X 2           

      Placed Close to Nursing Station Family Present and informed to notify staff if the need     

      to leave the bedside As available patient and family educated on Fall Prevention            

      Program and Strategies.                                                                     

                                                                                                  

Assessment:                                                                                       

16:43 Reassessment: SEE TRIAGE.                                                               vg1 

17:00 VAN Scoring: Arm Drift: Patients demonstrates NO arm weakness. Patient is VAN Negative. vg1 

      Visual Disturbance: No visual disturbance noted. Neglect: No neglect noted. The patient     

      has not been NPO before screening. The patient is alert, and able to follow commands.       

      The patient exhibits slurred or garbled speech. The patient is not exhibiting               

      difficulty speaking. The patient does not exhibit difficulty understanding words. The       

      patient is able to swallow own secretions with no drooling or need for suction. Patient     

      tolerated one teaspoon of water. No drooling, immediate coughing, gurgling, or clearing     

      of the throat was noted. The patient tolerated 90mL of water. No drooling, immediate        

      coughing, gurgling, or clearing of the throat was noted. The patient passed the bedside     

      swallow screening. Oral medications may be given as ordered. Contact Physician for          

      further diet orders. Provider notified of bedside swallow screening results: Jigar Flower MD. Reassessment: SEE TRIAGE.                                                         

17:00 TNKase (Tenecteplase) Screening: Contraindications: Is the patient on Aspirin, Heparin, vg1 

      or Warfarin: Yes.                                                                           

17:15 Reassessment: Urban placed outpatient on 2022.                    vg1 

17:40 Reassessment: Patient appears in no apparent distress at this time. Patient and/or      vg1 

      family updated on plan of care and expected duration. Pain level reassessed. Patient is     

      alert, oriented x 3, equal unlabored respirations, skin warm/dry/pink. Patient states       

      feeling better.                                                                             

18:40 Reassessment: Patient appears in no apparent distress at this time. No changes from     vg1 

      previously documented assessment. Patient and/or family updated on plan of care and         

      expected duration. Pain level reassessed. Patient is alert, oriented x 3, equal             

      unlabored respirations, skin warm/dry/pink. Patient denies pain at this time.               

18:52 Reassessment: WIFE PHONE NUMBER, Shama [phone].                                 vg1 

                                                                                                  

Vital Signs:                                                                                      

16:43  / 78; Pulse 56; Resp 19; Temp 97.9; Pulse Ox 99% ; Weight 68.49 kg; Height 5 ft. vg1 

      7 in. (170.18 cm); Pain 0/10;                                                               

18:00  / 83; Pulse 67; Resp 21; Pulse Ox 97% on R/A;                                    vg1 

18:30  / 89; Pulse 69; Resp 21; Pulse Ox 99% on R/A;                                    vg1 

16:43 Body Mass Index 23.65 (68.49 kg, 170.18 cm)                                             vg1 

                                                                                                  

NIH Stroke Scale Scores:                                                                          

16:43 NIHSS Score: 1                                                                          vg1 

17:21 NIHSS Score: 1                                                                          rn  

17:22 NIHSS Score: 0                                                                          vg1 

                                                                                                  

ED Course:                                                                                        

16:47 Patient arrived in ED.                                                                  rn  

16:48 Jigar Flower MD is Attending Physician.                                                rn  

16:58 Inserted saline lock: 18 gauge in right forearm, using aseptic technique. Blood         mb9 

      collected.                                                                                  

17:00 Arm band placed on. EKG completed in triage. Results shown to MD.                       vg1 

17:00 Patient has correct armband on for positive identification. Placed in gown. Bed in low  vg1 

      position. Call light in reach. Side rails up X2. Adult w/ patient. Client placed on         

      continuous cardiac and pulse oximetry monitoring. NIBP monitoring applied.                  

17:02 Head C Spine Mpr Wo Con In Process Unspecified.                                         EDMS

17:06 Stroke CXR 1 View In Process Unspecified.                                               EDMS

17:06 Nidia Raya, RN is Primary Nurse.                                                  vg1 

17:10 Triage completed.                                                                       vg1 

17:31 initiated transfer to [facility].                                                    bd  

17:45 pt denied at [facility] due to no lvat beds and er being on saturation, per Ayde. bd  

17:49 Jeremy Reyna MD is Hospitalizing Provider.                                            rn  

18:51 KEREN Horowitz MD is Private Physician.                                                       bd  

19:14 KEREN Horowitz MD is Referral Physician.                                                      rn  

19:41 Primary Nurse role handed off by Nidia Raya RN                                   wm  

19:44 No provider procedures requiring assistance completed. IV discontinued, intact,         ll3 

      bleeding controlled, No redness/swelling at site. Pressure dressing applied.                

                                                                                                  

Administered Medications:                                                                         

17:40 CANCELLED (Inappropriate at this time): Magnesium 400 mg PO once                        vg1 

                                                                                                  

                                                                                                  

Medication:                                                                                       

17:00 VIS not applicable for this client.                                                     vg1 

                                                                                                  

Point of Care Testing:                                                                            

      Blood Glucose:                                                                              

17:00 Blood Glucose: 197 mg/dL;                                                               vg1 

      Ranges:                                                                                     

                                                                                                  

Outcome:                                                                                          

17:23 ER care complete, transfer ordered by MD.                                               rn  

17:50 Decision to Hospitalize by Provider.                                                    rn  

19:14 Discharge ordered by MD.                                                                rn  

19:44 Discharged to home via wheelchair, with family, with significant other.                 ll3 

19:44 Condition: stable                                                                           

19:44 Discharge instructions given to patient, significant other, Instructed on discharge         

      instructions, follow up and referral plans. Demonstrated understanding of instructions,     

      follow-up care.                                                                             

19:45 Patient left the ED.                                                                    ll3 

                                                                                                  

                                                                                                  

NIH Stroke Scale - NIH Stroke Score                                                               

Date: 2022                                                                                  

Time: 16:43                                                                                       

Total Score = 1                                                                                   

  1a. Level of Consciousness (LOC) - 0(Alert)                                                     

  1b. Level of Consciousness (LOC) (Month \T\ Age) - 0(Both)                                      

  1c. LOC Commands (Open \T\ Closes Eyes/) - 0(Both)                                          

   2. Best Gaze (Lateral Gaze Paresis) - 0(Normal)                                                

   3. Visual Field Loss - 0(No visual loss)                                                       

   4. Facial Palsy - 0(Normal)                                                                    

  5a. Left Arm: Motor (10-second hold) - 0(No drift)                                              

  5b. Right Arm: Motor (10-second hold) - 0(No drift)                                             

  6a. Left Leg: Motor (5-second hold - always test supine) - 0(No drift)                          

  6b. Right Leg: Motor (5-second hold - always test supine) - 0(No drift)                         

   7. Limb Ataxia (finger/nose \T\ heel/shin - test with eyes open) - 0(Absent)                   

   8. Sensory Loss (pinprick arms/legs/face) - 0(Normal)                                          

   9. Best Language: Aphasia (description/naming/reading) - 1(Mild to moderate                    

      aphasia)                                                                                    

  10. Dysarthria (speech clarity - read or repeat words) - 0(Normal)                              

  11. Extinction and Inattention (visual/tactile/auditory/spatial/personal) - 0(No                

      abnormality)                                                                                

Initials: vg1                                                                                     

                                                                                                  

                                                                                                  

NIH Stroke Scale - NIH Stroke Score                                                               

Date: 2022                                                                                  

Time: 17:21                                                                                       

Total Score = 1                                                                                   

  1a. Level of Consciousness (LOC) - 0(Alert)                                                     

  1b. Level of Consciousness (LOC) (Month \T\ Age) - 0(Both)                                      

  1c. LOC Commands (Open \T\ Closes Eyes/) - 0(Both)                                          

   2. Best Gaze (Lateral Gaze Paresis) - 0(Normal)                                                

   3. Visual Field Loss - 0(No visual loss)                                                       

   4. Facial Palsy - 0(Normal)                                                                    

  5a. Left Arm: Motor (10-second hold) - 0(No drift)                                              

  5b. Right Arm: Motor (10-second hold) - 0(No drift)                                             

  6a. Left Leg: Motor (5-second hold - always test supine) - 0(No drift)                          

  6b. Right Leg: Motor (5-second hold - always test supine) - 0(No drift)                         

   7. Limb Ataxia (finger/nose \T\ heel/shin - test with eyes open) - 0(Absent)                   

   8. Sensory Loss (pinprick arms/legs/face) - 0(Normal)                                          

   9. Best Language: Aphasia (description/naming/reading) - 0(No aphasia)                         

  10. Dysarthria (speech clarity - read or repeat words) - 1(Mild to Moderate)                    

  11. Extinction and Inattention (visual/tactile/auditory/spatial/personal) - 0(No                

      abnormality)                                                                                

Initials: rn                                                                                      

                                                                                                  

                                                                                                  

NIH Stroke Scale - NIH Stroke Score                                                               

Date: 2022                                                                                  

Time: 17:22                                                                                       

Total Score = 0                                                                                   

  1a. Level of Consciousness (LOC) - 0(Alert)                                                     

  1b. Level of Consciousness (LOC) (Month \T\ Age) - 0(Both)                                      

  1c. LOC Commands (Open \T\ Closes Eyes/) - 0(Both)                                          

   2. Best Gaze (Lateral Gaze Paresis) - 0(Normal)                                                

   3. Visual Field Loss - 0(No visual loss)                                                       

   4. Facial Palsy - 0(Normal)                                                                    

  5a. Left Arm: Motor (10-second hold) - 0(No drift)                                              

  5b. Right Arm: Motor (10-second hold) - 0(No drift)                                             

  6a. Left Leg: Motor (5-second hold - always test supine) - 0(No drift)                          

  6b. Right Leg: Motor (5-second hold - always test supine) - 0(No drift)                         

   7. Limb Ataxia (finger/nose \T\ heel/shin - test with eyes open) - 0(Absent)                   

   8. Sensory Loss (pinprick arms/legs/face) - 0(Normal)                                          

   9. Best Language: Aphasia (description/naming/reading) - 0(No aphasia)                         

  10. Dysarthria (speech clarity - read or repeat words) - 0(Normal)                              

  11. Extinction and Inattention (visual/tactile/auditory/spatial/personal) - 0(No                

      abnormality)                                                                                

Initials: vg1                                                                                     

                                                                                                  

Signatures:                                                                                       

Dispatcher [employer]                           EDElizabeth José Roman, MD MD   rn                                                   

Elver, Nidia RN                    RN   vg1                                                  

Lisa Suarze Lynsea RN                      RN   ll3                                                  

Deloris Rodas, RN                 RN   mb9                                                  

                                                                                                  

Corrections: (The following items were deleted from the chart)                                    

17:12 16:43 PMHx: LVAD; vg1                                                           vg1         

17:46 17:44 pt denied at [facility] due to no LVAT beds and er being on saturation. bd bd          

                                                                                                  

**************************************************************************************************

## 2022-11-08 NOTE — EKG
Test Date:    2022-11-07               Test Time:    16:57:02

Technician:                                          

                                                     

MEASUREMENT RESULTS:                                       

Intervals:                                           

Rate:         63                                     

VT:                                                  

QRSD:         146                                    

QT:           442                                    

QTc:          452                                    

Axis:                                                

P:                                                   

VT:                                                  

QRS:          -60                                    

T:            115                                    

                                                     

INTERPRETIVE STATEMENTS:                                       

                                                     

V paced. 



Electronically Signed On 11-08-22 13:55:35 CST by Casey Buchanan

## 2024-10-24 NOTE — ER
Nurse's Notes                                                                                     

 [facility]                                                                 

Name: Tu Marie                                                                                 

Age: 72 yrs                                                                                       

Sex: Male                                                                                         

: 1950                                                                                   

MRN: [id number]                                                                                   

Arrival Date: 2022                                                                          

Time: 13:29                                                                                       

Account#: [id number]                                                                            

Bed 19                                                                                            

Private MD:                                                                                       

Diagnosis: Hematuria, unspecified                                                                 

                                                                                                  

Presentation:                                                                                     

                                                                                             

14:01 Chief complaint: Spouse and/or significant other states: "I know exactly what is wrong. ss  

      His bag got too full and it pulled his catheter out just a bit. I called Dr. Garcia and he      

      said to come to the ER and have it replaced. It's a 16 F coud.". Coronavirus screen:       

      Client denies travel out of the [place] in the last 14 days. Ebola Screen: Patient denies      

      exposure to infectious person. Patient denies travel to an Ebola-affected area in the       

      21 days before illness onset. Initial Sepsis Screen: Does the patient meet any 2            

      criteria? No. Patient's initial sepsis screen is negative. Does the patient have a          

      suspected source of infection? No. Patient's initial sepsis screen is negative. Risk        

      Assessment: Do you want to hurt yourself or someone else? Patient reports no desire to      

      harm self or others. Onset of symptoms was 2022.                                 

14:01 Method Of Arrival: Ambulatory                                                           ss  

14:01 Acuity: GERMAN 4                                                                           ss  

                                                                                                  

Triage Assessment:                                                                                

14:00 General: Appears in no apparent distress. Behavior is calm, cooperative. Pain:          jg9 

      Complains of pain in head of penis, shaft of penis and meatus.                              

                                                                                                  

Historical:                                                                                       

- Allergies:                                                                                      

14:05 Codeine;                                                                                ss  

14:05 Fentanyl;                                                                               ss  

14:05 Xanax;                                                                                  ss  

- PMHx:                                                                                           

14:05 CAD; CHF; Diabetes - IDDM; Hyperlipidemia; Hypertension;                                ss  

- PSHx:                                                                                           

14:05 LVAD;                                                                                   ss  

                                                                                                  

- Immunization history:: Client reports receiving the 2nd dose of the Covid vaccine.              

- Social history:: Smoking status: Patient denies any tobacco usage or history of.                

                                                                                                  

                                                                                                  

Screenin:44 Abuse screen: Denies threats or abuse. Denies injuries from another. Nutritional        jg9 

      screening: No deficits noted. Tuberculosis screening: No symptoms or risk factors           

      identified. Fall Risk Mental Status- Overestimates/Forgets Limitations (15 pts.).           

                                                                                                  

Assessment:                                                                                       

14:00 Reassessment: No changes from previously documented assessment. Patient and/or family   jg9 

      updated on plan of care and expected duration. Pain level reassessed. Patient is alert,     

      oriented x 3, equal unlabored respirations, skin warm/dry/pink.                             

15:00 Reassessment: No changes from previously documented assessment. Patient and/or family   jg9 

      updated on plan of care and expected duration. Pain level reassessed. Patient is alert,     

      oriented x 3, equal unlabored respirations, skin warm/dry/pink. patient reports             

      improvement in discomfort after urban was replaced.                                         

                                                                                                  

Vital Signs:                                                                                      

14:01  / 86; Pulse 62; Resp 16; Temp 98.0(TE); Pulse Ox 100% on R/A; Weight 72.12 kg;   ss  

      Height 5 ft. 7 in. (170.18 cm); Pain 8/10;                                                  

14:30  / 98; Pulse 62; Resp 20 S; Pulse Ox 98% on R/A;                                  jg9 

14:01 Body Mass Index 24.90 (72.12 kg, 170.18 cm)                                               

                                                                                                  

ED Course:                                                                                        

13:29 Patient arrived in ED.                                                                  rg4 

13:54 Navdeep Patel MD is Attending Physician.                                               11

14:05 Triage completed.                                                                         

14:05 Arm band placed on right wrist.                                                           

14:30 Urban cath inserted, using sterile technique, 16 Fr., by me, balloon inflated, urine    jg9 

      specimen collected.                                                                         

14:44 Yoly Funez, RN is Primary Nurse.                                                 jg9 

14:46 Patient has correct armband on for positive identification. Bed in low position. Call   jg9 

      light in reach. Side rails up X 1.                                                          

15:23 No provider procedures requiring assistance completed.                                  jg9 

15:23 Patient did not have IV access during this emergency room visit.                        jg9 

                                                                                                  

Administered Medications:                                                                         

No medications were administered                                                                  

                                                                                                  

                                                                                                  

Medication:                                                                                       

15:23 VIS not applicable for this client.                                                     jg9 

                                                                                                  

Outcome:                                                                                          

15:16 Discharge ordered by MD.                                                                [id number]

15:23 Discharged to home ambulatory.                                                          jg9 

15:23 Condition: stable                                                                           

15:23 Discharge instructions given to patient, Instructed on discharge instructions, follow       

      up and referral plans. Demonstrated understanding of instructions, follow-up care.          

15:30 Patient left the ED.                                                                    jg9 

                                                                                                  

Signatures:                                                                                       

Janna Noble RN                      RN                                                      

Jennifer Raya                                 4                                                  

Funez, Yoly, RN                   RN   jg9                                                  

Navdeep Patel MD                       MD   jr11                                                 

                                                                                                  

************************************************************************************************** 80